# Patient Record
Sex: MALE | Race: BLACK OR AFRICAN AMERICAN | NOT HISPANIC OR LATINO | Employment: FULL TIME | URBAN - METROPOLITAN AREA
[De-identification: names, ages, dates, MRNs, and addresses within clinical notes are randomized per-mention and may not be internally consistent; named-entity substitution may affect disease eponyms.]

---

## 2017-05-01 ENCOUNTER — HOSPITAL ENCOUNTER (EMERGENCY)
Facility: OTHER | Age: 27
Discharge: HOME | End: 2017-05-01
Attending: EMERGENCY MEDICINE
Payer: MEDICAID

## 2017-05-01 VITALS
WEIGHT: 200 LBS | TEMPERATURE: 100 F | HEIGHT: 70 IN | SYSTOLIC BLOOD PRESSURE: 134 MMHG | DIASTOLIC BLOOD PRESSURE: 92 MMHG | RESPIRATION RATE: 18 BRPM | HEART RATE: 91 BPM | OXYGEN SATURATION: 100 % | BODY MASS INDEX: 28.63 KG/M2

## 2017-05-01 DIAGNOSIS — K21.9 GASTROESOPHAGEAL REFLUX DISEASE, ESOPHAGITIS PRESENCE NOT SPECIFIED: ICD-10-CM

## 2017-05-01 DIAGNOSIS — L73.9 FOLLICULITIS: Primary | ICD-10-CM

## 2017-05-01 DIAGNOSIS — T23.109A: ICD-10-CM

## 2017-05-01 PROCEDURE — 99283 EMERGENCY DEPT VISIT LOW MDM: CPT

## 2017-05-01 RX ORDER — METOCLOPRAMIDE 10 MG/1
10 TABLET ORAL EVERY 6 HOURS PRN
Qty: 30 TABLET | Refills: 0 | Status: SHIPPED | OUTPATIENT
Start: 2017-05-01 | End: 2018-01-06

## 2017-05-01 RX ORDER — ONDANSETRON 4 MG/1
4 TABLET, ORALLY DISINTEGRATING ORAL EVERY 6 HOURS PRN
Qty: 10 TABLET | Refills: 0 | Status: SHIPPED | OUTPATIENT
Start: 2017-05-01 | End: 2018-01-06

## 2017-05-01 RX ORDER — BUSPIRONE HYDROCHLORIDE 15 MG/1
15 TABLET ORAL 3 TIMES DAILY
COMMUNITY
End: 2018-05-31

## 2017-05-01 RX ORDER — CLINDAMYCIN HYDROCHLORIDE 150 MG/1
150 CAPSULE ORAL 4 TIMES DAILY
Qty: 28 CAPSULE | Refills: 0 | Status: SHIPPED | OUTPATIENT
Start: 2017-05-01 | End: 2017-05-08

## 2017-05-01 RX ORDER — SILVER SULFADIAZINE 10 G/1000G
CREAM TOPICAL 2 TIMES DAILY
Qty: 20 G | Refills: 0 | Status: SHIPPED | OUTPATIENT
Start: 2017-05-01 | End: 2018-01-06

## 2017-05-01 RX ORDER — PANTOPRAZOLE SODIUM 20 MG/1
20 TABLET, DELAYED RELEASE ORAL DAILY
Qty: 30 TABLET | Refills: 0 | Status: SHIPPED | OUTPATIENT
Start: 2017-05-01 | End: 2018-05-31

## 2017-05-01 NOTE — ED AVS SNAPSHOT
[facility] EMERGENCY DEPARTMENT  [address] [facility] [address]               Ricki Alaniz   2017  1:52 PM   ED    Description:  Male : 1990   Department:  [facility] Emergency Department           Your Care was Coordinated By:     Provider Role From To    Efrem Syed MD Attending Provider 17 [phone] --      Reason for Visit     Abdominal Pain           Diagnoses this Visit        Comments    Folliculitis    -  Primary     Gastroesophageal reflux disease, esophagitis presence not specified           ED Disposition     ED Disposition Condition Comment    Discharge             To Do List           Follow-up Information     Follow up with Giovanny Edwards MD In 1 week.    Specialty:  Family Medicine    Contact information:    [address] [facility] [address]  [phone]         These Medications        Disp Refills Start End    pantoprazole (PROTONIX) 20 MG tablet 30 tablet 0 2017    Take 1 tablet (20 mg total) by mouth once daily. - Oral    Pharmacy: [facility] [address] AT [facility] #: [phone]       ondansetron (ZOFRAN-ODT) 4 MG TbDL 10 tablet 0 2017     Take 1 tablet (4 mg total) by mouth every 6 (six) hours as needed. - Oral    Pharmacy: [facility] [address] AT [facility] #: [phone]       metoclopramide HCl (REGLAN) 10 MG tablet 30 tablet 0 2017     Take 1 tablet (10 mg total) by mouth every 6 (six) hours as needed. - Oral    Pharmacy: [facility] [address] AT [facility] #: [phone]       clindamycin (CLEOCIN) 150 MG capsule 28 capsule 0 2017    Take 1 capsule (150 mg total) by mouth 4 (four) times daily. - Oral    Pharmacy: [facility] [address] AT [facility] #: [phone]         [facility]      [facility] - 24/7 Assistance  Unless otherwise directed by your provider, please contact [facility], our nurse care line that is available for 24/7 assistance.     Registered nurses in the [facility] provide: appointment scheduling, clinical advisement, health education, and other advisory services.  Call: [phone] (toll free)               Medications           Message regarding Medications     Verify the changes and/or additions to your medication regime listed below are the same as discussed with your clinician today.  If any of these changes or additions are incorrect, please notify your healthcare provider.        START taking these NEW medications        Refills    pantoprazole (PROTONIX) 20 MG tablet 0    Sig: Take 1 tablet (20 mg total) by mouth once daily.    Class: Print    Route: Oral    ondansetron (ZOFRAN-ODT) 4 MG TbDL 0    Sig: Take 1 tablet (4 mg total) by mouth every 6 (six) hours as needed.    Class: Print    Route: Oral    metoclopramide HCl (REGLAN) 10 MG tablet 0    Sig: Take 1 tablet (10 mg total) by mouth every 6 (six) hours as needed.    Class: Print    Route: Oral    clindamycin (CLEOCIN) 150 MG capsule 0    Sig: Take 1 capsule (150 mg total) by mouth 4 (four) times daily.    Class: Print    Route: Oral           Verify that the below list of medications is an accurate representation of the medications you are currently taking.  If none reported, the list may be blank. If incorrect, please contact your healthcare provider. Carry this list with you in case of emergency.           Current Medications     busPIRone (BUSPAR) 15 MG tablet Take 15 mg by mouth 3 (three) times daily.    clindamycin (CLEOCIN) 150 MG capsule Take 1 capsule (150 mg total) by mouth 4 (four) times daily.    lorazepam (ATIVAN) 0.5 MG tablet Take 1 tablet (0.5 mg total) by mouth every 6 (six) hours as needed for Anxiety.    metoclopramide HCl (REGLAN) 10 MG tablet Take 1 tablet  "(10 mg total) by mouth every 6 (six) hours as needed.    ondansetron (ZOFRAN-ODT) 4 MG TbDL Take 1 tablet (4 mg total) by mouth every 6 (six) hours as needed.    pantoprazole (PROTONIX) 20 MG tablet Take 1 tablet (20 mg total) by mouth once daily.    ranitidine (ZANTAC) 150 MG capsule Take 1 capsule (150 mg total) by mouth 2 (two) times daily.           Clinical Reference Information           Your Vitals Were     BP Pulse Temp Resp Height Weight    134/92 (BP Location: Left arm, Patient Position: Sitting) 91 99.8 °F (37.7 °C) (Temporal) 18 5' 10" (1.778 m) 90.7 kg (200 lb)    SpO2 BMI             100% 28.7 kg/m2         Allergies as of 5/1/2017        Reactions    Hydrocodone Nausea And Vomiting      Immunizations Administered on Date of Encounter - 5/1/2017     None      ED Micro, Lab, POCT     None      ED Imaging Orders     None        Discharge Instructions         Tips to Control Acid Reflux    To control acid reflux, youll need to make some basic diet and lifestyle changes. The simple steps outlined below may be all youll need to ease discomfort.  Watch what you eat  · Avoid fatty foods and spicy foods.  · Eat fewer acidic foods, such as citrus and tomato-based foods. These can increase symptoms.  · Limit drinking alcohol, caffeine, and fizzy beverages. All increase acid reflux.  · Try limiting chocolate, peppermint, and spearmint. These can worsen acid reflux in some people.  Watch when you eat  · Avoid lying down for 3 hours after eating.  · Do not snack before going to bed.  Raise your head  Raising your head and upper body by 4 to 6 inches helps limit reflux when youre lying down. Put blocks under the head of your bed frame to raise it.  Other changes  · Lose weight, if you need to  · Dont exercise near bedtime  · Avoid tight-fitting clothes  · Limit aspirin and ibuprofen  · Stop smoking   Date Last Reviewed: 7/1/2016  © 6864-6173 [employer]. [address] [place] [address], [place] [address]. " All rights reserved. This information is not intended as a substitute for professional medical care. Always follow your healthcare professional's instructions.          Discharge References/Attachments     FOLLICULITIS (ENGLISH)      [facility] Sign-Up     Activating your [facility] account is as easy as 1-2-3!     1) Visit [email or web address], select Sign Up Now, enter this activation code and your date of birth, then select Next.  [email or web address]  Expires: 6/15/2017  2:26 PM      2) Create a username and password to use when you visit [facility] in the future and select a security question in case you lose your password and select Next.    3) Enter your e-mail address and click Sign Up!    Additional Information  If you have questions, please e-mail [email or web address] or call [phone] to talk to our [facility] staff. Remember, [facility] is NOT to be used for urgent needs. For medical emergencies, dial 911.         Smoking Cessation     If you would like to quit smoking:   You may be eligible for free services if you are a [place] resident and started smoking cigarettes before September 1, 1990.  Call the Smoking Cessation Trust ([facility]) toll free at [phone] 223-1743 or [phone] [phone].   Call 1-800-QUIT-NOW if you do not meet the above criteria.   Contact us via email: [email or web address]   View our website for more information: [email or web address]         [facility] complies with applicable Federal civil rights laws and does not discriminate on the basis of race, color, national origin, age, disability, or sex.        Language Assistance Services     ATTENTION: Language assistance services are available, free of charge. Please call [phone].      ATENCIÓN: Si habla esha, tiene a miller disposición servicios gratuitos de asistencia lingüística. Llame al [phone].     CHÚ Ý: N?u b?n nói Ti?ng Vi?t, có các d?ch v? h? tr? ngôn ng? mi?n phí dành cho b?n. G?i s?  [phone].
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
## 2017-05-01 NOTE — DISCHARGE INSTRUCTIONS
Tips to Control Acid Reflux    To control acid reflux, youll need to make some basic diet and lifestyle changes. The simple steps outlined below may be all youll need to ease discomfort.  Watch what you eat  · Avoid fatty foods and spicy foods.  · Eat fewer acidic foods, such as citrus and tomato-based foods. These can increase symptoms.  · Limit drinking alcohol, caffeine, and fizzy beverages. All increase acid reflux.  · Try limiting chocolate, peppermint, and spearmint. These can worsen acid reflux in some people.  Watch when you eat  · Avoid lying down for 3 hours after eating.  · Do not snack before going to bed.  Raise your head  Raising your head and upper body by 4 to 6 inches helps limit reflux when youre lying down. Put blocks under the head of your bed frame to raise it.  Other changes  · Lose weight, if you need to  · Dont exercise near bedtime  · Avoid tight-fitting clothes  · Limit aspirin and ibuprofen  · Stop smoking   Date Last Reviewed: 7/1/2016  © 2714-6166 [employer] [place]. [address] [place] [address], [place] [address]. All rights reserved. This information is not intended as a substitute for professional medical care. Always follow your healthcare professional's instructions.

## 2018-01-06 ENCOUNTER — HOSPITAL ENCOUNTER (EMERGENCY)
Facility: OTHER | Age: 28
Discharge: HOME | End: 2018-01-06
Attending: EMERGENCY MEDICINE
Payer: MEDICAID

## 2018-01-06 VITALS
DIASTOLIC BLOOD PRESSURE: 99 MMHG | OXYGEN SATURATION: 99 % | HEART RATE: 72 BPM | TEMPERATURE: 98 F | HEIGHT: 70 IN | WEIGHT: 195 LBS | RESPIRATION RATE: 18 BRPM | BODY MASS INDEX: 27.92 KG/M2 | SYSTOLIC BLOOD PRESSURE: 163 MMHG

## 2018-01-06 DIAGNOSIS — R10.30 LOWER ABDOMINAL PAIN: Primary | ICD-10-CM

## 2018-01-06 DIAGNOSIS — K59.00 CONSTIPATION, UNSPECIFIED CONSTIPATION TYPE: ICD-10-CM

## 2018-01-06 LAB
BILIRUBIN, POC UA: ABNORMAL
BLOOD, POC UA: ABNORMAL
CLARITY, POC UA: CLEAR
COLOR, POC UA: YELLOW
GLUCOSE, POC UA: NEGATIVE
KETONES, POC UA: ABNORMAL
LEUKOCYTE EST, POC UA: NEGATIVE
NITRITE, POC UA: NEGATIVE
PH UR STRIP: 5.5 [PH]
PROTEIN, POC UA: ABNORMAL
SPECIFIC GRAVITY, POC UA: >=1.03
UROBILINOGEN, POC UA: 1 E.U./DL

## 2018-01-06 PROCEDURE — 81003 URINALYSIS AUTO W/O SCOPE: CPT

## 2018-01-06 PROCEDURE — 99284 EMERGENCY DEPT VISIT MOD MDM: CPT

## 2018-01-06 RX ORDER — SYRING-NEEDL,DISP,INSUL,0.3 ML 29 G X1/2"
296 SYRINGE, EMPTY DISPOSABLE MISCELLANEOUS ONCE AS NEEDED
Qty: 295 ML | Refills: 0 | COMMUNITY
Start: 2018-01-06 | End: 2018-01-06

## 2018-01-06 NOTE — ED TRIAGE NOTES
Pt presents to ER with c/o LLQ pain that radiates down into groin accompanied by nausea that started today.  He had diarrhea and nausea 3 days ago.

## 2018-01-06 NOTE — ED PROVIDER NOTES
Encounter Date: 1/6/2018       History     Chief Complaint   Patient presents with    Abdominal Pain     pt c/o LLQ ABD pain x 2 day, pt also c/o intermitted nausea and diarrhea x 3 days ago     27 y.o. Male with past medical history of schizophrenia, bipolar, depression and chronic constipation presents to emergency department complaining of acute constipation, nausea, left lower quadrant abdominal pain and difficulty urinating since yesterday.  Patient localizes abdominal pain to left lower quadrant and is mild and intermittent.  He states he suffered with constipation and had a good bowel movement in 3 days.  He reports only scant, thick,watery stool yesterday.  He also reports urinary frequency yesterday and states he has not been able to urinate at all today.      The history is provided by the patient.     Review of patient's allergies indicates:   Allergen Reactions    Hydrocodone Nausea And Vomiting     Past Medical History:   Diagnosis Date    Bipolar 1 disorder     Constipation     Depression     Schizophrenia      History reviewed. No pertinent surgical history.  No family history on file.  Social History   Substance Use Topics    Smoking status: Current Every Day Smoker     Packs/day: 1.00    Smokeless tobacco: Not on file    Alcohol use 0.6 oz/week     1 Shots of liquor per week      Comment: occasionally beer     Review of Systems   Constitutional: Positive for chills and fever (subjective).   HENT: Negative.    Eyes: Negative.    Respiratory: Negative.    Cardiovascular: Negative.    Gastrointestinal: Positive for abdominal pain, constipation and nausea. Negative for blood in stool and vomiting.   Genitourinary: Positive for decreased urine volume, difficulty urinating and frequency (yesterday now resolved). Negative for dysuria, hematuria and urgency.   Neurological: Negative for dizziness.       Physical Exam     Initial Vitals [01/06/18 0128]   BP Pulse Resp Temp SpO2   (!) 169/109 74 18  98.3 °F (36.8 °C) 99 %      MAP       129         Physical Exam    Nursing note and vitals reviewed.  Constitutional: He appears well-developed and well-nourished. He is not diaphoretic. No distress.   HENT:   Head: Normocephalic and atraumatic.   Eyes: Conjunctivae are normal. Pupils are equal, round, and reactive to light.   Neck: Normal range of motion. Neck supple.   Cardiovascular: Regular rhythm and intact distal pulses.   Pulmonary/Chest: No accessory muscle usage. No tachypnea. No respiratory distress.   Abdominal: He exhibits no distension. There is no tenderness.   Musculoskeletal: Normal range of motion. He exhibits no tenderness.   Neurological: He is alert and oriented to person, place, and time.   Skin: Skin is warm and intact.   Psychiatric: He has a normal mood and affect.         ED Course   Procedures  Labs Reviewed   POCT URINALYSIS W/O SCOPE   POCT URINALYSIS W/O SCOPE             Medical Decision Making:   Differential Diagnosis:   Nephrolithiasis, pyelonephritis, diverticulitis, others.   Clinical Tests:   Lab Tests: Ordered and Reviewed  Radiological Study: Ordered and Reviewed  ED Management:  Patient able to void in the emergency department without difficulty. Abdominal exam benign.  Will treat constipation and refer to PCP.                    ED Course      Labs Reviewed  Admission on 01/06/2018   Component Date Value Ref Range Status    Glucose, UA 01/06/2018 Negative*  Final    Bilirubin, UA 01/06/2018 2+*  Final    Ketones, UA 01/06/2018 Trace*  Final    Spec Grav UA 01/06/2018 >=1.030*  Final    Blood, UA 01/06/2018 Trace-lysed*  Final    PH, UA 01/06/2018 5.5   Final    Protein, UA 01/06/2018 1+*  Final    Urobilinogen, UA 01/06/2018 1.0  E.U./dL Final    Nitrite, UA 01/06/2018 Negative*  Final    Leukocytes, UA 01/06/2018 Negative*  Final    Color, UA 01/06/2018 Yellow   Final    Clarity, UA 01/06/2018 Clear   Final        Imaging Reviewed    Imaging Results          CT  Renal Stone Study ABD Pelvis WO (Final result)  Result time 01/06/18 02:40:45    Final result by Davonte Harirson MD (01/06/18 02:40:45)                 Impression:      No acute intra-abdominal abnormalities identified.        Electronically signed by: DAVONTE HARRISON MD  Date:     01/06/18  Time:    02:40              Narrative:    Clinical indication: 27-year-old male with left lower quadrant abdominal pain.    Comparison: CT renal stone study 11/2015.    Technique: Transaxial images were obtained through the abdomen and pelvis in 5 mm increments without the use of p.o. or IV contrast.    Findings:  The visualized portion of the heart is unremarkable.  The lung bases are clear.    No focal hepatic abnormalities identified on this noncontrast exam.  There is no intra-or extrahepatic biliary ductal dilatation.  The gallbladder is unremarkable.  The stomach, pancreas, spleen, and adrenal glands are unremarkable.    Kidneys show no evidence of stones or hydronephrosis.  Ureters are unremarkable along their courses.      Appendix is visualized and is unremarkable.  The visualized loops of small and large bowel show no evidence of obstruction or inflammation.  No free air or free fluid.    Aorta tapers normally.     Osseous structures are unremarkable.  Subcutaneous soft tissue structures are unremarkable.                              Medications given in ED    Medications - No data to display    Discharge Medications     Medication List with Changes/Refills   New Medications    MAGNESIUM CITRATE SOLUTION    Take 296 mLs by mouth once as needed (constipation).   Current Medications    BUSPIRONE (BUSPAR) 15 MG TABLET    Take 15 mg by mouth 3 (three) times daily.    PANTOPRAZOLE (PROTONIX) 20 MG TABLET    Take 1 tablet (20 mg total) by mouth once daily.   Discontinued Medications    LORAZEPAM (ATIVAN) 0.5 MG TABLET    Take 1 tablet (0.5 mg total) by mouth every 6 (six) hours as needed for Anxiety.    METOCLOPRAMIDE HCL  (REGLAN) 10 MG TABLET    Take 1 tablet (10 mg total) by mouth every 6 (six) hours as needed.    ONDANSETRON (ZOFRAN-ODT) 4 MG TBDL    Take 1 tablet (4 mg total) by mouth every 6 (six) hours as needed.    RANITIDINE (ZANTAC) 150 MG CAPSULE    Take 1 capsule (150 mg total) by mouth 2 (two) times daily.    SILVER SULFADIAZINE 1% (SILVADENE) 1 % CREAM    Apply topically 2 (two) times daily.             Patient discharged to home in stable condition with instructions to:   1. Please take all meds as prescribed.  2. Follow-up with your primary care doctor   3. Return precautions discussed and patient and/or family/caretaker understands to return to the emergency room for any concerns including worsening of your current symptoms, fever, chills, night sweats, worsening pain, chest pain, shortness of breath, nausea, vomiting, diarrhea, bleeding, headache, difficulty talking, visual disturbances, weakness, numbness or any other acute concerns    Note was created using voice recognition software. Note may have occasional typographical errors that may not have been identified and edited despite good driss initial review prior to signing.    Clinical Impression:   The primary encounter diagnosis was Lower abdominal pain. A diagnosis of Constipation, unspecified constipation type was also pertinent to this visit.                           Ana Andino MD  01/06/18 8818

## 2018-05-31 ENCOUNTER — HOSPITAL ENCOUNTER (EMERGENCY)
Facility: HOSPITAL | Age: 28
Discharge: HOME | End: 2018-05-31
Attending: EMERGENCY MEDICINE
Payer: MEDICAID

## 2018-05-31 VITALS
OXYGEN SATURATION: 100 % | RESPIRATION RATE: 15 BRPM | SYSTOLIC BLOOD PRESSURE: 105 MMHG | DIASTOLIC BLOOD PRESSURE: 61 MMHG | TEMPERATURE: 98 F | HEART RATE: 56 BPM

## 2018-05-31 DIAGNOSIS — E87.6 HYPOKALEMIA: ICD-10-CM

## 2018-05-31 DIAGNOSIS — R25.2 MUSCLE CRAMPING: Primary | ICD-10-CM

## 2018-05-31 DIAGNOSIS — R74.8 ELEVATED CK: ICD-10-CM

## 2018-05-31 LAB
ALBUMIN SERPL BCP-MCNC: 4.5 G/DL
ALP SERPL-CCNC: 99 U/L
ALT SERPL W/O P-5'-P-CCNC: 20 U/L
ANION GAP SERPL CALC-SCNC: 16 MMOL/L
AST SERPL-CCNC: 31 U/L
BACTERIA #/AREA URNS HPF: ABNORMAL /HPF
BASOPHILS # BLD AUTO: 0.03 K/UL
BASOPHILS NFR BLD: 0.2 %
BILIRUB SERPL-MCNC: 1.1 MG/DL
BILIRUB UR QL STRIP: NEGATIVE
BUN SERPL-MCNC: 12 MG/DL
CALCIUM SERPL-MCNC: 9.9 MG/DL
CHLORIDE SERPL-SCNC: 99 MMOL/L
CK SERPL-CCNC: 536 U/L
CK SERPL-CCNC: 550 U/L
CLARITY UR: ABNORMAL
CO2 SERPL-SCNC: 23 MMOL/L
COLOR UR: YELLOW
CREAT SERPL-MCNC: 1.5 MG/DL
DIFFERENTIAL METHOD: ABNORMAL
EOSINOPHIL # BLD AUTO: 0 K/UL
EOSINOPHIL NFR BLD: 0.2 %
ERYTHROCYTE [DISTWIDTH] IN BLOOD BY AUTOMATED COUNT: 12.4 %
EST. GFR  (AFRICAN AMERICAN): >60 ML/MIN/1.73 M^2
EST. GFR  (NON AFRICAN AMERICAN): >60 ML/MIN/1.73 M^2
GLUCOSE SERPL-MCNC: 96 MG/DL
GLUCOSE UR QL STRIP: NEGATIVE
HCT VFR BLD AUTO: 41.1 %
HGB BLD-MCNC: 14.6 G/DL
HGB UR QL STRIP: NEGATIVE
HYALINE CASTS #/AREA URNS LPF: 6 /LPF
KETONES UR QL STRIP: ABNORMAL
LEUKOCYTE ESTERASE UR QL STRIP: NEGATIVE
LYMPHOCYTES # BLD AUTO: 2.3 K/UL
LYMPHOCYTES NFR BLD: 13 %
MCH RBC QN AUTO: 31.5 PG
MCHC RBC AUTO-ENTMCNC: 35.5 G/DL
MCV RBC AUTO: 89 FL
MICROSCOPIC COMMENT: ABNORMAL
MONOCYTES # BLD AUTO: 1.8 K/UL
MONOCYTES NFR BLD: 9.9 %
NEUTROPHILS # BLD AUTO: 13.5 K/UL
NEUTROPHILS NFR BLD: 76.3 %
NITRITE UR QL STRIP: NEGATIVE
PH UR STRIP: 5 [PH] (ref 5–8)
PLATELET # BLD AUTO: 205 K/UL
PMV BLD AUTO: 9.9 FL
POTASSIUM SERPL-SCNC: 2.9 MMOL/L
PROT SERPL-MCNC: 7.9 G/DL
PROT UR QL STRIP: ABNORMAL
RBC # BLD AUTO: 4.63 M/UL
RBC #/AREA URNS HPF: 1 /HPF (ref 0–4)
SODIUM SERPL-SCNC: 138 MMOL/L
SP GR UR STRIP: 1.02 (ref 1–1.03)
SQUAMOUS #/AREA URNS HPF: 3 /HPF
URN SPEC COLLECT METH UR: ABNORMAL
UROBILINOGEN UR STRIP-ACNC: ABNORMAL EU/DL
WBC # BLD AUTO: 17.74 K/UL
WBC #/AREA URNS HPF: 1 /HPF (ref 0–5)

## 2018-05-31 PROCEDURE — 93010 ELECTROCARDIOGRAM REPORT: CPT | Mod: ,,, | Performed by: INTERNAL MEDICINE

## 2018-05-31 PROCEDURE — 81000 URINALYSIS NONAUTO W/SCOPE: CPT

## 2018-05-31 PROCEDURE — 85025 COMPLETE CBC W/AUTO DIFF WBC: CPT

## 2018-05-31 PROCEDURE — 25000003 PHARM REV CODE 250: Performed by: EMERGENCY MEDICINE

## 2018-05-31 PROCEDURE — 93005 ELECTROCARDIOGRAM TRACING: CPT

## 2018-05-31 PROCEDURE — 96361 HYDRATE IV INFUSION ADD-ON: CPT

## 2018-05-31 PROCEDURE — 82550 ASSAY OF CK (CPK): CPT

## 2018-05-31 PROCEDURE — 96360 HYDRATION IV INFUSION INIT: CPT

## 2018-05-31 PROCEDURE — 80053 COMPREHEN METABOLIC PANEL: CPT

## 2018-05-31 PROCEDURE — 99284 EMERGENCY DEPT VISIT MOD MDM: CPT | Mod: 25

## 2018-05-31 RX ORDER — METHOCARBAMOL 500 MG/1
1000 TABLET, FILM COATED ORAL
Status: COMPLETED | OUTPATIENT
Start: 2018-05-31 | End: 2018-05-31

## 2018-05-31 RX ORDER — POTASSIUM CHLORIDE 20 MEQ/1
40 TABLET, EXTENDED RELEASE ORAL
Status: COMPLETED | OUTPATIENT
Start: 2018-05-31 | End: 2018-05-31

## 2018-05-31 RX ADMIN — SODIUM CHLORIDE 1000 ML: 0.9 INJECTION, SOLUTION INTRAVENOUS at 06:05

## 2018-05-31 RX ADMIN — METHOCARBAMOL 1000 MG: 500 TABLET ORAL at 06:05

## 2018-05-31 RX ADMIN — POTASSIUM CHLORIDE 40 MEQ: 1500 TABLET, EXTENDED RELEASE ORAL at 08:05

## 2018-05-31 NOTE — ED PROVIDER NOTES
Encounter Date: 5/31/2018    SCRIBE #1 NOTE: I, Neelam Barboza, am scribing for, and in the presence of,  Alma Dominguez MD. I have scribed the following portions of the note - Other sections scribed: HPI, ROS, PE and MDM.       History     Chief Complaint   Patient presents with    Muscle Cramps     Pt working outside all day.     CC: Muscle Cramps    HPI: This 27 y.o. Male, with a medical history of bipolar 1 disorder and schizophrenia, presents to the ED via EMS transportation c/o acute, constant generalized muscle cramps that began at 11:00 am this morning. Pt reports that he has been working outside in the hot sun today and has not consumed enough liquids. He states that he was diagnosed with rhabdomyolysis at [facility] last Wednesday (5/23/18) and adds that he has been taking Clindamycin as well as Amoxicillin and pain medication for a rash and oral abscess. Pt notes that he returned to work yesterday. He adds that he had to strain in order to urinate lately, noting that his urine has been dark yellow in color. No other associated symptoms. No alleviating factors.           The history is provided by the patient. No  was used.     Review of patient's allergies indicates:   Allergen Reactions    Hydrocodone Nausea And Vomiting     Past Medical History:   Diagnosis Date    Bipolar 1 disorder     Constipation     Depression     Schizophrenia      History reviewed. No pertinent surgical history.  History reviewed. No pertinent family history.  Social History   Substance Use Topics    Smoking status: Current Every Day Smoker     Packs/day: 1.00    Smokeless tobacco: Never Used    Alcohol use 0.6 oz/week     1 Shots of liquor per week      Comment: occasionally beer     Review of Systems   Constitutional: Negative for chills and fever.   HENT: Negative for rhinorrhea and sore throat.    Eyes: Negative for pain.   Respiratory: Negative for cough.     Cardiovascular: Negative for chest pain.   Gastrointestinal: Negative for abdominal pain, diarrhea, nausea and vomiting.   Genitourinary: Negative for dysuria.        (+) straining to urinate (urine is dark yellow in color)   Musculoskeletal: Negative for back pain.        (+) generalized muscle cramps   Skin: Negative for rash.   Neurological: Negative for syncope and weakness.   Psychiatric/Behavioral: The patient is not nervous/anxious.        Physical Exam     Initial Vitals [05/31/18 1822]   BP Pulse Resp Temp SpO2   131/81 68 16 97.5 °F (36.4 °C) 100 %      MAP       97.67         Physical Exam    Nursing note and vitals reviewed.  Constitutional: Vital signs are normal. He appears well-developed and well-nourished. He is active.  Non-toxic appearance. No distress.   HENT:   Head: Normocephalic and atraumatic.   Eyes: EOM are normal.   Neck: Trachea normal. Neck supple.   Cardiovascular: Normal rate, regular rhythm and normal heart sounds. Exam reveals no friction rub.    No murmur heard.  Pulmonary/Chest: Breath sounds normal. No respiratory distress. He has no wheezes. He has no rhonchi. He has no rales.   Abdominal: Soft. Normal appearance and bowel sounds are normal. He exhibits no distension. There is no tenderness.   Musculoskeletal: Normal range of motion. He exhibits no edema.   Generalized body aches present. Patient in pain with active movement.   Neurological: He is alert.   No focal deficits.   Skin: Skin is warm, dry and intact.   Psychiatric: He has a normal mood and affect.         ED Course   Procedures  Labs Reviewed   CBC W/ AUTO DIFFERENTIAL - Abnormal; Notable for the following:        Result Value    WBC 17.74 (*)     MCH 31.5 (*)     Gran # (ANC) 13.5 (*)     Mono # 1.8 (*)     Gran% 76.3 (*)     Lymph% 13.0 (*)     All other components within normal limits   COMPREHENSIVE METABOLIC PANEL - Abnormal; Notable for the following:     Potassium 2.9 (*)     Creatinine 1.5 (*)     Total  Bilirubin 1.1 (*)     All other components within normal limits   URINALYSIS - Abnormal; Notable for the following:     Appearance, UA Hazy (*)     Protein, UA 1+ (*)     Ketones, UA Trace (*)     Urobilinogen, UA 2.0-3.0 (*)     All other components within normal limits   CK - Abnormal; Notable for the following:      (*)     All other components within normal limits   URINALYSIS MICROSCOPIC - Abnormal; Notable for the following:     Hyaline Casts, UA 6 (*)     All other components within normal limits   CK - Abnormal; Notable for the following:      (*)     All other components within normal limits    Narrative:     Repeat after 2nd fluid bolus     EKG Readings: (Independently Interpreted)   Sinus bradycardia rate 49 (previous EKG 2016 similar with rate of 46 bpm.          Medical Decision Making:   Initial Assessment:   27 y.o male  with a history of recently diagnosed rhabdomyolysis last week, presents today with similar symptoms of body cramps while working in the hot sun today.   Differential Diagnosis:   Rhabdomyolysis vs dehydration vs metabolic derangement vs anxiety. Due to recent hx of rhabdomyolysis will need to further evaluate.   ED Management:  Will get labs including CK. Will hydrate, treat muscle cramps and reassess disposition pending results.     Update note: labs show initial CK of 550 that is treading down after fluid fluids and a Cr of 1.7. Patient urinating multiple times in ED with IV fluid hydration. Potassium 2.9 and replaced PO. Patient tolerated PO. EKG without signs of U waves and with sinus bradycardia rate 49 bmp without any complaints. (similar EKG in 2016) Will discharge with recs for PO hydration and pcp f/u.    10:56 PM 5/31/2018  Paco Dominguez MD                  Scribe Attestation:   Scribe #1: I performed the above scribed service and the documentation accurately describes the services I performed. I attest to the accuracy of the  note.    Attending Attestation:           Physician Attestation for Scribe:  Physician Attestation Statement for Scribe #1: I, Alma Dominguez MD, reviewed documentation, as scribed by Neelam Barboza in my presence, and it is both accurate and complete.                    Clinical Impression:   The primary encounter diagnosis was Muscle cramping. Diagnoses of Hypokalemia and Elevated CK were also pertinent to this visit.                           Alma Dominguez MD  05/31/18 6224       Alma Dominguez MD  05/31/18 6593

## 2018-05-31 NOTE — ED TRIAGE NOTES
Pt.reports being dehydrated and recently diagnosed with rhabdomyolysis, pt. Reports not drinking or eating enough today and started experiencing generalized muscle cramps. Pt. Rates pain 10/10, denies any n/v/d, pt. Is diaphoretic and anxious in NAD. Soiled clothing removed and pt. Placed in dry clothing.

## 2018-06-01 NOTE — ED NOTES
Received report from Janice. Pt is awaiting lab work and is currently infusion fluids. No distress is noted. No complaints at this time. Family is at bedside, call bell in reach, side rails up x2. No distress is noted.Will continue to be monitored.

## 2019-03-21 ENCOUNTER — HOSPITAL ENCOUNTER (EMERGENCY)
Facility: HOSPITAL | Age: 29
Discharge: HOME | End: 2019-03-21
Attending: INTERNAL MEDICINE
Payer: MEDICAID

## 2019-03-21 VITALS
DIASTOLIC BLOOD PRESSURE: 95 MMHG | BODY MASS INDEX: 25.48 KG/M2 | HEART RATE: 87 BPM | OXYGEN SATURATION: 99 % | TEMPERATURE: 101 F | RESPIRATION RATE: 18 BRPM | WEIGHT: 182 LBS | HEIGHT: 71 IN | SYSTOLIC BLOOD PRESSURE: 127 MMHG

## 2019-03-21 DIAGNOSIS — K64.4 EXTERNAL HEMORRHOID: Primary | ICD-10-CM

## 2019-03-21 LAB
BILIRUBIN, POC UA: ABNORMAL
BLOOD, POC UA: NEGATIVE
CLARITY, POC UA: CLEAR
COLOR, POC UA: YELLOW
GLUCOSE, POC UA: NEGATIVE
KETONES, POC UA: ABNORMAL
LEUKOCYTE EST, POC UA: NEGATIVE
NITRITE, POC UA: NEGATIVE
PH UR STRIP: 5.5 [PH]
PROTEIN, POC UA: ABNORMAL
SPECIFIC GRAVITY, POC UA: >=1.03
UROBILINOGEN, POC UA: 0.2 E.U./DL

## 2019-03-21 PROCEDURE — 99283 EMERGENCY DEPT VISIT LOW MDM: CPT | Mod: ER

## 2019-03-21 PROCEDURE — 25000003 PHARM REV CODE 250: Mod: ER | Performed by: INTERNAL MEDICINE

## 2019-03-21 RX ORDER — PRAMOXINE HYDROCHLORIDE 10 MG/G
AEROSOL, FOAM TOPICAL 3 TIMES DAILY PRN
Qty: 15 G | Refills: 0 | Status: SHIPPED | OUTPATIENT
Start: 2019-03-21 | End: 2019-04-10

## 2019-03-21 RX ORDER — ACETAMINOPHEN 500 MG
1000 TABLET ORAL
Status: COMPLETED | OUTPATIENT
Start: 2019-03-21 | End: 2019-03-21

## 2019-03-21 RX ADMIN — ACETAMINOPHEN 1000 MG: 500 TABLET, FILM COATED ORAL at 10:03

## 2019-03-22 NOTE — ED PROVIDER NOTES
Encounter Date: 3/21/2019    SCRIBE #1 NOTE: I, Kary Villagran, am scribing for, and in the presence of,  Dr. Salinas. I have scribed the following portions of the note - Other sections scribed: HPI, ROS, PE.       History     Chief Complaint   Patient presents with    Back Pain     pt reports he has low left sided back pain that radiates around to his pelvic area and testicles. He reports dark urine and dysuria since 3 today. Feels tired and having chills     Ricki Alaniz is a 28 y.o. male who presents to the ED complaining of lower abdominal pain for a few hours. He states burning to his rectum s/p BM at 3PM. He ate nachos with cheese and peppers and notes a burning sensation after BM. No blood in his stool. He reports chills.      The history is provided by the patient. No  was used.     Review of patient's allergies indicates:   Allergen Reactions    Hydrocodone Nausea And Vomiting     Past Medical History:   Diagnosis Date    Bipolar 1 disorder     Constipation     Depression     Schizophrenia      History reviewed. No pertinent surgical history.  History reviewed. No pertinent family history.  Social History     Tobacco Use    Smoking status: Current Every Day Smoker     Packs/day: 1.00    Smokeless tobacco: Never Used   Substance Use Topics    Alcohol use: Yes     Alcohol/week: 0.6 oz     Types: 1 Shots of liquor per week     Comment: occasionally beer    Drug use: No     Review of Systems   Constitutional: Positive for chills.   Gastrointestinal: Positive for abdominal pain and rectal pain. Negative for blood in stool, constipation, diarrhea, nausea and vomiting.   All other systems reviewed and are negative.      Physical Exam     Initial Vitals [03/21/19 2103]   BP Pulse Resp Temp SpO2   (!) 127/95 87 18 (!) 100.5 °F (38.1 °C) 99 %      MAP       --         Physical Exam    Nursing note and vitals reviewed.  Constitutional: He appears well-developed and well-nourished.   HENT:    Head: Normocephalic and atraumatic.   Right Ear: External ear normal.   Left Ear: External ear normal.   Nose: Nose normal.   Mouth/Throat: Oropharynx is clear and moist.   Eyes: Conjunctivae and EOM are normal. Pupils are equal, round, and reactive to light.   Neck: Normal range of motion. Neck supple.   Cardiovascular: Normal rate, regular rhythm, normal heart sounds and intact distal pulses. Exam reveals no gallop and no friction rub.    No murmur heard.  Pulmonary/Chest: Effort normal and breath sounds normal. No respiratory distress. He has no wheezes. He has no rhonchi. He has no rales.   Abdominal: Soft. Bowel sounds are normal. He exhibits no distension. There is no tenderness. There is no rebound.   Genitourinary: Rectal exam shows external hemorrhoid (at 7 o'clock; nonthrombosed).   Musculoskeletal: Normal range of motion.   Neurological: He is alert and oriented to person, place, and time.   Skin: Skin is warm and dry.   Psychiatric: He has a normal mood and affect. His behavior is normal.         ED Course   Procedures  Labs Reviewed   POCT URINALYSIS W/O SCOPE - Abnormal; Notable for the following components:       Result Value    Glucose, UA Negative (*)     Bilirubin, UA 1+ (*)     Ketones, UA Trace (*)     Spec Grav UA >=1.030 (*)     Blood, UA Negative (*)     Protein, UA Trace (*)     Nitrite, UA Negative (*)     Leukocytes, UA Negative (*)     All other components within normal limits   POCT URINALYSIS W/O SCOPE          Imaging Results    None          Medical Decision Making:   History:   Old Medical Records: I decided to obtain old medical records.  Initial Assessment:   This is an emergent evaluation of an 28 y.o. male presenting with lower abdominal pain, and burning to his rectum since 3PM today. He is febrile in triage.   Clinical Tests:   Lab Tests: Ordered and Reviewed            Scribe Attestation:   Scribe #1: I performed the above scribed service and the documentation accurately  describes the services I performed. I attest to the accuracy of the note.    This document was produced by a scribe under my direction and in my presence. I agree with the content of the note and have made any necessary edits.     Dr. Salinas    03/22/2019 6:32 AM             Clinical Impression:     1. External hemorrhoid            Disposition:   Disposition: Discharged  Condition: Stable                        Landry Salinas MD  03/22/19 0633

## 2019-04-09 ENCOUNTER — HOSPITAL ENCOUNTER (EMERGENCY)
Facility: HOSPITAL | Age: 29
Discharge: HOME | End: 2019-04-10
Attending: EMERGENCY MEDICINE
Payer: MEDICAID

## 2019-04-09 DIAGNOSIS — N50.82 SCROTAL PAIN: ICD-10-CM

## 2019-04-09 DIAGNOSIS — R10.9 LEFT FLANK PAIN: ICD-10-CM

## 2019-04-09 DIAGNOSIS — N43.3 HYDROCELE, LEFT: Primary | ICD-10-CM

## 2019-04-09 PROCEDURE — 87491 CHLMYD TRACH DNA AMP PROBE: CPT

## 2019-04-09 PROCEDURE — 96374 THER/PROPH/DIAG INJ IV PUSH: CPT

## 2019-04-09 PROCEDURE — 99284 EMERGENCY DEPT VISIT MOD MDM: CPT | Mod: 25

## 2019-04-09 PROCEDURE — 81003 URINALYSIS AUTO W/O SCOPE: CPT

## 2019-04-09 PROCEDURE — 96361 HYDRATE IV INFUSION ADD-ON: CPT

## 2019-04-09 RX ORDER — MORPHINE SULFATE 10 MG/ML
5 INJECTION INTRAMUSCULAR; INTRAVENOUS; SUBCUTANEOUS
Status: DISCONTINUED | OUTPATIENT
Start: 2019-04-09 | End: 2019-04-09

## 2019-04-09 RX ORDER — ONDANSETRON 2 MG/ML
4 INJECTION INTRAMUSCULAR; INTRAVENOUS
Status: DISCONTINUED | OUTPATIENT
Start: 2019-04-09 | End: 2019-04-09

## 2019-04-10 VITALS
HEIGHT: 71 IN | BODY MASS INDEX: 25.48 KG/M2 | HEART RATE: 60 BPM | WEIGHT: 182 LBS | RESPIRATION RATE: 16 BRPM | SYSTOLIC BLOOD PRESSURE: 116 MMHG | TEMPERATURE: 98 F | OXYGEN SATURATION: 100 % | DIASTOLIC BLOOD PRESSURE: 72 MMHG

## 2019-04-10 LAB
ALBUMIN SERPL BCP-MCNC: 3.2 G/DL (ref 3.5–5.2)
ALP SERPL-CCNC: 68 U/L (ref 55–135)
ALT SERPL W/O P-5'-P-CCNC: 28 U/L (ref 10–44)
ANION GAP SERPL CALC-SCNC: 5 MMOL/L (ref 8–16)
AST SERPL-CCNC: 32 U/L (ref 10–40)
BASOPHILS # BLD AUTO: 0.02 K/UL (ref 0–0.2)
BASOPHILS NFR BLD: 0.3 % (ref 0–1.9)
BILIRUB SERPL-MCNC: 0.7 MG/DL (ref 0.1–1)
BILIRUB UR QL STRIP: NEGATIVE
BUN SERPL-MCNC: 12 MG/DL (ref 6–20)
CALCIUM SERPL-MCNC: 8.7 MG/DL (ref 8.7–10.5)
CHLORIDE SERPL-SCNC: 110 MMOL/L (ref 95–110)
CLARITY UR: CLEAR
CO2 SERPL-SCNC: 29 MMOL/L (ref 23–29)
COLOR UR: YELLOW
CREAT SERPL-MCNC: 0.8 MG/DL (ref 0.5–1.4)
DIFFERENTIAL METHOD: ABNORMAL
EOSINOPHIL # BLD AUTO: 0.1 K/UL (ref 0–0.5)
EOSINOPHIL NFR BLD: 1.9 % (ref 0–8)
ERYTHROCYTE [DISTWIDTH] IN BLOOD BY AUTOMATED COUNT: 13.6 % (ref 11.5–14.5)
EST. GFR  (AFRICAN AMERICAN): >60 ML/MIN/1.73 M^2
EST. GFR  (NON AFRICAN AMERICAN): >60 ML/MIN/1.73 M^2
GLUCOSE SERPL-MCNC: 91 MG/DL (ref 70–110)
GLUCOSE UR QL STRIP: NEGATIVE
HCT VFR BLD AUTO: 36.1 % (ref 40–54)
HGB BLD-MCNC: 12 G/DL (ref 14–18)
HGB UR QL STRIP: NEGATIVE
KETONES UR QL STRIP: NEGATIVE
LEUKOCYTE ESTERASE UR QL STRIP: NEGATIVE
LYMPHOCYTES # BLD AUTO: 3.3 K/UL (ref 1–4.8)
LYMPHOCYTES NFR BLD: 43.8 % (ref 18–48)
MCH RBC QN AUTO: 31.8 PG (ref 27–31)
MCHC RBC AUTO-ENTMCNC: 33.2 G/DL (ref 32–36)
MCV RBC AUTO: 96 FL (ref 82–98)
MONOCYTES # BLD AUTO: 0.6 K/UL (ref 0.3–1)
MONOCYTES NFR BLD: 8.2 % (ref 4–15)
NEUTROPHILS # BLD AUTO: 3.4 K/UL (ref 1.8–7.7)
NEUTROPHILS NFR BLD: 45.8 % (ref 38–73)
NITRITE UR QL STRIP: NEGATIVE
PH UR STRIP: 7 [PH] (ref 5–8)
PLATELET # BLD AUTO: 232 K/UL (ref 150–350)
PMV BLD AUTO: 9.2 FL (ref 9.2–12.9)
POTASSIUM SERPL-SCNC: 3.9 MMOL/L (ref 3.5–5.1)
PROT SERPL-MCNC: 5.4 G/DL (ref 6–8.4)
PROT UR QL STRIP: NEGATIVE
RBC # BLD AUTO: 3.77 M/UL (ref 4.6–6.2)
SODIUM SERPL-SCNC: 144 MMOL/L (ref 136–145)
SP GR UR STRIP: 1.02 (ref 1–1.03)
URN SPEC COLLECT METH UR: NORMAL
UROBILINOGEN UR STRIP-ACNC: NEGATIVE EU/DL
WBC # BLD AUTO: 7.45 K/UL (ref 3.9–12.7)

## 2019-04-10 PROCEDURE — 96374 THER/PROPH/DIAG INJ IV PUSH: CPT

## 2019-04-10 PROCEDURE — 25500020 PHARM REV CODE 255: Performed by: EMERGENCY MEDICINE

## 2019-04-10 PROCEDURE — 96361 HYDRATE IV INFUSION ADD-ON: CPT

## 2019-04-10 PROCEDURE — 85025 COMPLETE CBC W/AUTO DIFF WBC: CPT

## 2019-04-10 PROCEDURE — 25000003 PHARM REV CODE 250: Performed by: PHYSICIAN ASSISTANT

## 2019-04-10 PROCEDURE — 63600175 PHARM REV CODE 636 W HCPCS: Performed by: PHYSICIAN ASSISTANT

## 2019-04-10 PROCEDURE — 80053 COMPREHEN METABOLIC PANEL: CPT

## 2019-04-10 RX ORDER — KETOROLAC TROMETHAMINE 30 MG/ML
10 INJECTION, SOLUTION INTRAMUSCULAR; INTRAVENOUS
Status: COMPLETED | OUTPATIENT
Start: 2019-04-10 | End: 2019-04-10

## 2019-04-10 RX ADMIN — SODIUM CHLORIDE 1000 ML: 0.9 INJECTION, SOLUTION INTRAVENOUS at 12:04

## 2019-04-10 RX ADMIN — KETOROLAC TROMETHAMINE 10 MG: 30 INJECTION, SOLUTION INTRAMUSCULAR at 12:04

## 2019-04-10 RX ADMIN — IOHEXOL 75 ML: 350 INJECTION, SOLUTION INTRAVENOUS at 01:04

## 2019-04-10 NOTE — ED TRIAGE NOTES
Pt arrived to the ED via POV from home with c/o flank pain. Pt reports pain started approx 3 days ago and on today began with nausea. Denies v/d, constipation, urinary symptoms. Rates pain 8/10 on pain scale.

## 2019-04-10 NOTE — ED PROVIDER NOTES
Encounter Date: 4/9/2019       History     Chief Complaint   Patient presents with    Flank Pain     Pt. arrives to ER complaining of left side flank pain that started about 3 days ago after he finished abx. Pt. also reports nausea, denies fever, v/d, rates pain 10/10.     Patient is a 28-year-old male with a history of bipolar 1 disorder, schizophrenia, depression is presenting to the ER for evaluation of left lower quadrant pain. Patient states the symptoms started earlier today.  He states that the pain radiates to his left groin.  He states he has been feeling nauseated, no vomiting.  Patient was recently treated for colitis and finished a course of antibiotics including Cipro and Flagyl.  He denies any diarrhea, blood in stool or black tarry stool.  Patient denies any penile discharge or testicular pain.  Has not noticed any UTI symptoms including dysuria hematuria.  No prior history of kidney infection kidney stones.  No prior abdominal surgeries.  He has not taken any medication for symptoms today.  Patient states he was recently treated for an STD about 2 months ago.  No fevers or chills.    The history is provided by the patient.     Review of patient's allergies indicates:   Allergen Reactions    Hydrocodone Nausea And Vomiting     Past Medical History:   Diagnosis Date    Bipolar 1 disorder     Constipation     Depression     Schizophrenia      History reviewed. No pertinent surgical history.  History reviewed. No pertinent family history.  Social History     Tobacco Use    Smoking status: Current Every Day Smoker     Packs/day: 1.00    Smokeless tobacco: Never Used   Substance Use Topics    Alcohol use: Yes     Alcohol/week: 0.6 oz     Types: 1 Shots of liquor per week     Comment: occasionally beer    Drug use: No     Review of Systems   Constitutional: Negative for chills and fever.   HENT: Negative for congestion.    Respiratory: Negative for cough and shortness of breath.    Cardiovascular:  Negative for chest pain.   Gastrointestinal: Positive for abdominal pain. Negative for blood in stool, constipation, diarrhea, nausea and vomiting.   Genitourinary: Negative for discharge, dysuria, flank pain, hematuria, penile pain, penile swelling, scrotal swelling, testicular pain and urgency.   Skin: Negative for wound.   Allergic/Immunologic: Negative for immunocompromised state.   Neurological: Negative for weakness.   Hematological: Does not bruise/bleed easily.   Psychiatric/Behavioral: Negative for confusion.       Physical Exam     Initial Vitals [04/09/19 2326]   BP Pulse Resp Temp SpO2   110/76 (!) 58 16 98.2 °F (36.8 °C) 100 %      MAP       --         Physical Exam    Vitals reviewed.  Constitutional: He appears well-developed and well-nourished. He is not diaphoretic. No distress.   HENT:   Head: Normocephalic and atraumatic.   Mouth/Throat: Oropharynx is clear and moist.   Eyes: Conjunctivae and EOM are normal.   Neck: Neck supple.   Cardiovascular: Normal rate, regular rhythm, normal heart sounds and intact distal pulses.   Pulmonary/Chest: Breath sounds normal.   Abdominal: Soft. Normal appearance. He exhibits no distension. There is tenderness (left groin) in the left lower quadrant. There is no rigidity, no rebound, no guarding and no CVA tenderness. No hernia. Hernia confirmed negative in the right inguinal area and confirmed negative in the left inguinal area.   Genitourinary: Penis normal. Right testis shows no mass, no swelling and no tenderness. Left testis shows tenderness (Diffuse tenderness to the left scrotum.). Left testis shows no mass and no swelling. Circumcised.   Neurological: He is alert and oriented to person, place, and time.   Skin: Skin is warm and dry.         ED Course   Procedures  Labs Reviewed   CBC W/ AUTO DIFFERENTIAL - Abnormal; Notable for the following components:       Result Value    RBC 3.77 (*)     Hemoglobin 12.0 (*)     Hematocrit 36.1 (*)     MCH 31.8 (*)      All other components within normal limits   COMPREHENSIVE METABOLIC PANEL - Abnormal; Notable for the following components:    Total Protein 5.4 (*)     Albumin 3.2 (*)     Anion Gap 5 (*)     All other components within normal limits   C. TRACHOMATIS/N. GONORRHOEAE BY AMP DNA   URINALYSIS, REFLEX TO URINE CULTURE          Imaging Results          CT Abdomen Pelvis With Contrast (Final result)  Result time 04/10/19 01:16:35    Final result by Katherine Sagastume MD (04/10/19 01:16:35)                 Impression:      No acute intra-abdominal abnormalities identified.      Electronically signed by: Katherine Sagastume MD  Date:    04/10/2019  Time:    01:16             Narrative:    EXAMINATION:  CT ABDOMEN PELVIS WITH CONTRAST    CLINICAL HISTORY:  LLQ pain/ flank pain;    TECHNIQUE:  Low dose axial images, sagittal and coronal reformations were obtained from the lung bases to the pubic symphysis following the IV administration of 75 mL of Omnipaque 350 .  Oral contrast was not given.    COMPARISON:  CT abdomen and pelvis from January 2018.    FINDINGS:  The visualized portion of the heart is unremarkable.  The lung bases are clear.    No significant hepatic abnormalities are identified.  There is no intra-or extrahepatic biliary ductal dilatation.  Gallbladder is contracted.  The stomach, pancreas, spleen, and adrenal glands are unremarkable.    Kidneys enhance normally with no evidence of hydronephrosis.  No definite abnormalities are seen along the ureteral courses.  Urinary bladder and prostate show no significant abnormalities.    Suspected appendix is visualized and is unremarkable.  No abnormalities are seen in the region.  The visualized loops of small and large bowel show no evidence of obstruction or inflammation.  Moderate retained stool is seen in the colon.  No free air or free fluid.    Aorta tapers normally.    No acute osseous abnormality identified. Subcutaneous soft tissue structures are unremarkable.                                US Scrotum And Testicles (Final result)  Result time 04/10/19 01:02:27    Final result by Katherine Sagastume MD (04/10/19 01:02:27)                 Impression:      Small left hydrocele.  No evidence of testicular torsion.      Electronically signed by: Katherine Sagastume MD  Date:    04/10/2019  Time:    01:02             Narrative:    EXAMINATION:  US SCROTUM AND TESTICLES    CLINICAL HISTORY:  Scrotal pain    TECHNIQUE:  Sonography of the scrotum and testes.    COMPARISON:  None.    FINDINGS:  Right testicle measures 3.9 x 2.5 x 2.8 cm.  Left testicle measures 4.1 x 2.6 x 3.2 cm.  Testicles demonstrate normal homogeneous echotexture with no evidence of solid mass.  Normal vascularity is observed.  No evidence of testicular torsion.  Small left hydrocele is seen.  No evidence of right-sided hydrocele.  No evidence of varicocele.  Small bilateral epididymal head cysts are noted.  Epididymis are otherwise normal in appearance bilaterally.                                       APC / Resident Notes:   Patient seen in the ER promptly upon arrival.  He is afebrile, no acute distress.  Minimal tenderness on palpation to the left lower quadrant of the abdomen.  Abdomen soft, nondistended. No CVA tenderness on exam.  Minimal tenderness on palpation to the left scrotum.  There is no erythema, mass or swelling noted. IV access established, labs ordered, fluids given.  Patient given Toradol for discomfort.    Laboratory studies show normal white count of 7.4.  Hemoglobin is stable. Chemistries were found to be unremarkable. Normal liver and kidney functions.  Urinalysis does not show evidence of infection or blood.  GC Chlamydia obtained, pending results.  Patient does not have any concerns for STDs at this time.  Will hold off on antibiotic treatment until cultures result.     CT of the abdomen pelvis does not show any acute intra-abdominal abnormality.  No evidence of diverticulitis or colitis.  There  is moderate retained stool noted. No free air or free fluid.  Ultrasound of the scrotum reveals small left hydrocele.  No evidence of testicular torsion.  Patient advised to follow up with Urology this week.  He was advised to take Tylenol for pain if needed.  He was given strict return precautions the ED.  He is stable for discharge and close follow-up.                 Clinical Impression:       ICD-10-CM ICD-9-CM   1. Hydrocele, left N43.3 603.9   2. Scrotal pain N50.82 608.9   3. Left flank pain R10.9 789.09         Disposition:   Disposition: Discharged  Condition: Stable                        Arlet Arroyo PA-C  04/10/19 0146

## 2019-04-10 NOTE — DISCHARGE INSTRUCTIONS
Please follow-up with Urology if your symptoms persist.  Tylenol for pain if needed.  Follow up with family doctor this week.

## 2019-04-11 LAB
C TRACH DNA SPEC QL NAA+PROBE: NOT DETECTED
N GONORRHOEA DNA SPEC QL NAA+PROBE: NOT DETECTED

## 2019-04-20 ENCOUNTER — HOSPITAL ENCOUNTER (EMERGENCY)
Facility: HOSPITAL | Age: 29
Discharge: HOME | End: 2019-04-20
Attending: EMERGENCY MEDICINE
Payer: MEDICAID

## 2019-04-20 VITALS
SYSTOLIC BLOOD PRESSURE: 127 MMHG | HEIGHT: 70 IN | RESPIRATION RATE: 16 BRPM | BODY MASS INDEX: 26.77 KG/M2 | OXYGEN SATURATION: 98 % | DIASTOLIC BLOOD PRESSURE: 71 MMHG | TEMPERATURE: 98 F | HEART RATE: 73 BPM | WEIGHT: 187 LBS

## 2019-04-20 DIAGNOSIS — Z71.1 CONCERN ABOUT STD IN MALE WITHOUT DIAGNOSIS: Primary | ICD-10-CM

## 2019-04-20 LAB
BILIRUB UR QL STRIP: NEGATIVE
CLARITY UR: CLEAR
COLOR UR: YELLOW
GLUCOSE UR QL STRIP: NEGATIVE
HGB UR QL STRIP: NEGATIVE
KETONES UR QL STRIP: NEGATIVE
LEUKOCYTE ESTERASE UR QL STRIP: NEGATIVE
NITRITE UR QL STRIP: NEGATIVE
PH UR STRIP: 7 [PH] (ref 5–8)
PROT UR QL STRIP: NEGATIVE
SP GR UR STRIP: 1.02 (ref 1–1.03)
URN SPEC COLLECT METH UR: ABNORMAL
UROBILINOGEN UR STRIP-ACNC: ABNORMAL EU/DL

## 2019-04-20 PROCEDURE — 87086 URINE CULTURE/COLONY COUNT: CPT

## 2019-04-20 PROCEDURE — 87491 CHLMYD TRACH DNA AMP PROBE: CPT

## 2019-04-20 PROCEDURE — 99283 EMERGENCY DEPT VISIT LOW MDM: CPT

## 2019-04-20 PROCEDURE — 81003 URINALYSIS AUTO W/O SCOPE: CPT

## 2019-04-20 RX ORDER — PHENAZOPYRIDINE HYDROCHLORIDE 200 MG/1
200 TABLET, FILM COATED ORAL
Qty: 12 TABLET | Refills: 0 | Status: SHIPPED | OUTPATIENT
Start: 2019-04-20 | End: 2019-04-24

## 2019-04-20 NOTE — ED PROVIDER NOTES
Encounter Date: 4/20/2019       History     Chief Complaint   Patient presents with    Exposure to STD     Stated having lower abdominal pain,testicle pain and penile pain for past 2 weeks. Stated had diarrhea this am. Also complained of a drip at the end of his penis     28-year-old male with history of schizophrenia presents to the emergency department for 2 week history of dysuria associated with bilateral testicular pain, worse on the left.  Patient states his current symptoms are identical to the symptoms that he was evaluated for on 04/09 where he was found to have a hydrocele.  He states that his current testicular pain is not different in any way from the symptoms that he was evaluated for in 4/9.  Patient had negative GC cultures at that time.  Denies fever, nausea, vomiting. Patient has not followed up with his PCP or Urology.  No medications taken prior to arrival.        Review of patient's allergies indicates:   Allergen Reactions    Hydrocodone Nausea And Vomiting     Past Medical History:   Diagnosis Date    Bipolar 1 disorder     Constipation     Depression     Gonorrhea     Schizophrenia      History reviewed. No pertinent surgical history.  No family history on file.  Social History     Tobacco Use    Smoking status: Current Every Day Smoker     Packs/day: 1.00     Types: Cigarettes    Smokeless tobacco: Never Used   Substance Use Topics    Alcohol use: Yes     Alcohol/week: 0.6 oz     Types: 1 Shots of liquor per week     Comment: occasionally beer    Drug use: No     Review of Systems   Constitutional: Negative for fever.   HENT: Negative for congestion, sore throat and trouble swallowing.    Respiratory: Negative for cough and shortness of breath.    Cardiovascular: Negative for chest pain.   Gastrointestinal: Negative for abdominal pain, constipation, diarrhea, nausea and vomiting.   Genitourinary: Positive for dysuria and testicular pain. Negative for flank pain, frequency and  urgency.   Musculoskeletal: Negative for back pain.   Skin: Negative for rash.   Neurological: Negative for headaches.   All other systems reviewed and are negative.      Physical Exam     Initial Vitals [04/20/19 0357]   BP Pulse Resp Temp SpO2   127/71 73 16 98.3 °F (36.8 °C) 98 %      MAP       --         Physical Exam    Nursing note and vitals reviewed.  Constitutional: He appears well-developed and well-nourished. He is not diaphoretic. No distress.   HENT:   Head: Normocephalic and atraumatic.   Nose: Nose normal.   Eyes: Conjunctivae and EOM are normal. Right eye exhibits no discharge. Left eye exhibits no discharge.   Neck: Normal range of motion. No tracheal deviation present. No JVD present.   Cardiovascular: Normal rate, regular rhythm and normal heart sounds. Exam reveals no friction rub.    No murmur heard.  Pulmonary/Chest: Breath sounds normal. No stridor. No respiratory distress. He has no wheezes. He has no rhonchi. He has no rales. He exhibits no tenderness.   Abdominal: Soft. He exhibits no distension. There is no tenderness. There is no rigidity, no rebound, no guarding, no CVA tenderness, no tenderness at McBurney's point and negative Richardson's sign.   Genitourinary: Penis normal. Right testis shows no mass, no swelling and no tenderness. Left testis shows no mass, no swelling and no tenderness. No penile erythema or penile tenderness. No discharge found.   Musculoskeletal: Normal range of motion.   Neurological: He is alert and oriented to person, place, and time.   Skin: Skin is warm and dry. No rash and no abscess noted. No erythema. No pallor.         ED Course   Procedures  Labs Reviewed   URINALYSIS, REFLEX TO URINE CULTURE - Abnormal; Notable for the following components:       Result Value    Urobilinogen, UA 2.0-3.0 (*)     All other components within normal limits    Narrative:     Preferred Collection Type->Urine, Clean Catch   C. TRACHOMATIS/N. GONORRHOEAE BY AMP DNA   CULTURE, URINE           Imaging Results    None          Medical Decision Making:   History:   Old Medical Records: I decided to obtain old medical records.  Initial Assessment:   28-year-old male with dysuria  Clinical Tests:   Lab Tests: Ordered and Reviewed  ED Management:  Patients current symptoms are unchanged from his symptoms that he was previously evaluated for this ED on 04/09.  Patient has a known hydrocele that he has not followed up with Urology for yet.  I carefully considered but doubt testicular torsion, epididymitis, and testicular abscess today.  No evidence of UTI on urinalysis today.  Given repeat visit for similar symptoms, I will send a urine culture in addition to GC cultures today.  Recent GC culture negative. I also carefully considered but doubt acute appendicitis and ureteral stone.      Sent home with supportive care. Advising PCP and urology follow up. Strict return precautions discussed. Agreeable to plan.                         Clinical Impression:       ICD-10-CM ICD-9-CM   1. Concern about STD in male without diagnosis Z71.1 V65.5                                Tramaine Gamez PA-C  04/20/19 0556

## 2019-04-20 NOTE — ED TRIAGE NOTES
Patient arrived to ED with c/o LLQ and RLQ abd pain, dysuria, penile discharge, diarrhea, and bilateral testicular pain x 1 week.  Reports possible exposure to STD due to previous symptoms the same as prior symptoms of STD.  Reports hx of gonorrhea x 2 months ago and treated at [facility].  Reports having unprotected sex with one sexual partner.

## 2019-04-22 LAB — BACTERIA UR CULT: NORMAL

## 2019-04-23 LAB
C TRACH DNA SPEC QL NAA+PROBE: NOT DETECTED
N GONORRHOEA DNA SPEC QL NAA+PROBE: NOT DETECTED

## 2019-05-14 ENCOUNTER — HOSPITAL ENCOUNTER (EMERGENCY)
Facility: HOSPITAL | Age: 29
Discharge: HOME | End: 2019-05-14
Attending: EMERGENCY MEDICINE
Payer: MEDICAID

## 2019-05-14 VITALS
OXYGEN SATURATION: 98 % | TEMPERATURE: 98 F | DIASTOLIC BLOOD PRESSURE: 65 MMHG | HEIGHT: 70 IN | SYSTOLIC BLOOD PRESSURE: 116 MMHG | HEART RATE: 86 BPM | BODY MASS INDEX: 26.2 KG/M2 | WEIGHT: 183 LBS | RESPIRATION RATE: 18 BRPM

## 2019-05-14 DIAGNOSIS — Z11.1 NORMAL SCREENING CHEST X-RAY FOR TUBERCULOSIS: Primary | ICD-10-CM

## 2019-05-14 DIAGNOSIS — Z11.1 SCREENING-PULMONARY TB: ICD-10-CM

## 2019-05-14 PROCEDURE — 99283 EMERGENCY DEPT VISIT LOW MDM: CPT | Mod: 25

## 2019-05-15 NOTE — ED PROVIDER NOTES
Encounter Date: 5/14/2019       History     Chief Complaint   Patient presents with    TB Screening     Pt needs chest xray to rule out TB for admission to the shelter in which he as a bed. Pt unable to come earlier due to job obligations.      This is a 28-year-old male who presents for chest x-ray for to be screaming so that he can get a bed in a shelter.  Patient denies any bloody sputum, cough, night sweats, weight loss, contact with anyone with active TB for any concerns.        Review of patient's allergies indicates:   Allergen Reactions    Hydrocodone Nausea And Vomiting     Past Medical History:   Diagnosis Date    Bipolar 1 disorder     Constipation     Depression     Gonorrhea     Schizophrenia      No past surgical history on file.  No family history on file.  Social History     Tobacco Use    Smoking status: Current Every Day Smoker     Packs/day: 1.00     Types: Cigarettes    Smokeless tobacco: Never Used   Substance Use Topics    Alcohol use: Yes     Alcohol/week: 0.6 oz     Types: 1 Shots of liquor per week     Comment: occasionally beer    Drug use: No     Review of Systems   Constitutional: Negative for fever.   HENT: Negative for sore throat.    Respiratory: Negative for shortness of breath.    Cardiovascular: Negative for chest pain.   Gastrointestinal: Negative for nausea.   Genitourinary: Negative for dysuria.   Musculoskeletal: Negative for back pain.   Skin: Negative for rash.   Neurological: Negative for weakness.   Hematological: Does not bruise/bleed easily.       Physical Exam     Initial Vitals [05/14/19 1936]   BP Pulse Resp Temp SpO2   116/65 86 18 97.8 °F (36.6 °C) 98 %      MAP       --         Physical Exam    Nursing note and vitals reviewed.  Constitutional: He appears well-developed and well-nourished.   HENT:   Head: Normocephalic.   Eyes: Conjunctivae are normal.   Neck: Normal range of motion. Neck supple.   Cardiovascular: Normal rate, regular rhythm and normal  heart sounds.   Pulmonary/Chest: Breath sounds normal.   Musculoskeletal: Normal range of motion.   Neurological: He is alert and oriented to person, place, and time.   Skin: Skin is warm and dry.   Psychiatric: He has a normal mood and affect.         ED Course   Procedures  Labs Reviewed - No data to display       Imaging Results    None          Medical Decision Making:   Differential Diagnosis:   Tb, fear complaint, screening exam  ED Management:  Diagnosis management comments: This is an urgent evaluation of a 28-year-old male that presented to the ER with c/o needing TB screening . Pts exam was as above.     xrays were reviewed and discussed with pt.     Based on exam today - I have low suspicion for medical, surgical or other life threatening condition and I believe pt is safe for discharge and outpatient f/u.    Pt verbalizes understanding of d/c instructions and will return for worsening condition.                          Clinical Impression:       ICD-10-CM ICD-9-CM   1. Screening-pulmonary TB Z11.1 V74.1         Disposition:   Disposition: Discharged  Condition: Stable                        Alicia Delatorre NP  05/14/19 2042

## 2019-05-15 NOTE — ED TRIAGE NOTES
"Pt here for TB screening to be cleared for bed at homeless shelter. Pt does state "diaz had a little hacking cough for about a week".   "

## 2019-06-20 ENCOUNTER — HOSPITAL ENCOUNTER (EMERGENCY)
Facility: HOSPITAL | Age: 29
Discharge: HOME | End: 2019-06-20
Attending: EMERGENCY MEDICINE
Payer: MEDICAID

## 2019-06-20 VITALS
OXYGEN SATURATION: 99 % | RESPIRATION RATE: 18 BRPM | DIASTOLIC BLOOD PRESSURE: 58 MMHG | HEIGHT: 70 IN | TEMPERATURE: 98 F | SYSTOLIC BLOOD PRESSURE: 102 MMHG | BODY MASS INDEX: 26.92 KG/M2 | WEIGHT: 188 LBS | HEART RATE: 62 BPM

## 2019-06-20 DIAGNOSIS — K59.00 CONSTIPATION, UNSPECIFIED CONSTIPATION TYPE: ICD-10-CM

## 2019-06-20 DIAGNOSIS — L02.91 ABSCESS OF MULTIPLE SITES: ICD-10-CM

## 2019-06-20 DIAGNOSIS — R74.8 ELEVATED LIPASE: Primary | ICD-10-CM

## 2019-06-20 LAB
ALBUMIN SERPL BCP-MCNC: 3.8 G/DL (ref 3.5–5.2)
ALP SERPL-CCNC: 100 U/L (ref 55–135)
ALT SERPL W/O P-5'-P-CCNC: 20 U/L (ref 10–44)
ANION GAP SERPL CALC-SCNC: 10 MMOL/L (ref 8–16)
AST SERPL-CCNC: 28 U/L (ref 10–40)
BASOPHILS # BLD AUTO: 0.02 K/UL (ref 0–0.2)
BASOPHILS NFR BLD: 0.2 % (ref 0–1.9)
BILIRUB SERPL-MCNC: 0.5 MG/DL (ref 0.1–1)
BILIRUB UR QL STRIP: NEGATIVE
BUN SERPL-MCNC: 21 MG/DL (ref 6–20)
CALCIUM SERPL-MCNC: 9.3 MG/DL (ref 8.7–10.5)
CHLORIDE SERPL-SCNC: 103 MMOL/L (ref 95–110)
CLARITY UR: CLEAR
CO2 SERPL-SCNC: 25 MMOL/L (ref 23–29)
COLOR UR: YELLOW
CREAT SERPL-MCNC: 1 MG/DL (ref 0.5–1.4)
DIFFERENTIAL METHOD: ABNORMAL
EOSINOPHIL # BLD AUTO: 0.3 K/UL (ref 0–0.5)
EOSINOPHIL NFR BLD: 2.6 % (ref 0–8)
ERYTHROCYTE [DISTWIDTH] IN BLOOD BY AUTOMATED COUNT: 12.7 % (ref 11.5–14.5)
EST. GFR  (AFRICAN AMERICAN): >60 ML/MIN/1.73 M^2
EST. GFR  (NON AFRICAN AMERICAN): >60 ML/MIN/1.73 M^2
GLUCOSE SERPL-MCNC: 95 MG/DL (ref 70–110)
GLUCOSE UR QL STRIP: NEGATIVE
HCT VFR BLD AUTO: 46.3 % (ref 40–54)
HGB BLD-MCNC: 15.3 G/DL (ref 14–18)
HGB UR QL STRIP: NEGATIVE
KETONES UR QL STRIP: NEGATIVE
LEUKOCYTE ESTERASE UR QL STRIP: NEGATIVE
LIPASE SERPL-CCNC: 241 U/L (ref 4–60)
LYMPHOCYTES # BLD AUTO: 3.1 K/UL (ref 1–4.8)
LYMPHOCYTES NFR BLD: 31.2 % (ref 18–48)
MCH RBC QN AUTO: 31.2 PG (ref 27–31)
MCHC RBC AUTO-ENTMCNC: 33 G/DL (ref 32–36)
MCV RBC AUTO: 94 FL (ref 82–98)
MICROSCOPIC COMMENT: NORMAL
MONOCYTES # BLD AUTO: 0.9 K/UL (ref 0.3–1)
MONOCYTES NFR BLD: 8.7 % (ref 4–15)
NEUTROPHILS # BLD AUTO: 5.7 K/UL (ref 1.8–7.7)
NEUTROPHILS NFR BLD: 57.3 % (ref 38–73)
NITRITE UR QL STRIP: NEGATIVE
PH UR STRIP: 5 [PH] (ref 5–8)
PLATELET # BLD AUTO: 254 K/UL (ref 150–350)
PMV BLD AUTO: 10.1 FL (ref 9.2–12.9)
POTASSIUM SERPL-SCNC: 3.7 MMOL/L (ref 3.5–5.1)
PROT SERPL-MCNC: 6.9 G/DL (ref 6–8.4)
PROT UR QL STRIP: NEGATIVE
RBC # BLD AUTO: 4.91 M/UL (ref 4.6–6.2)
SODIUM SERPL-SCNC: 138 MMOL/L (ref 136–145)
SP GR UR STRIP: >1.03 (ref 1–1.03)
SQUAMOUS #/AREA URNS HPF: 1 /HPF
URN SPEC COLLECT METH UR: ABNORMAL
UROBILINOGEN UR STRIP-ACNC: NEGATIVE EU/DL
WBC # BLD AUTO: 9.93 K/UL (ref 3.9–12.7)

## 2019-06-20 PROCEDURE — 96374 THER/PROPH/DIAG INJ IV PUSH: CPT

## 2019-06-20 PROCEDURE — 25000003 PHARM REV CODE 250: Performed by: PHYSICIAN ASSISTANT

## 2019-06-20 PROCEDURE — 81000 URINALYSIS NONAUTO W/SCOPE: CPT

## 2019-06-20 PROCEDURE — 85025 COMPLETE CBC W/AUTO DIFF WBC: CPT

## 2019-06-20 PROCEDURE — 83690 ASSAY OF LIPASE: CPT

## 2019-06-20 PROCEDURE — 80053 COMPREHEN METABOLIC PANEL: CPT

## 2019-06-20 PROCEDURE — 99284 EMERGENCY DEPT VISIT MOD MDM: CPT | Mod: 25

## 2019-06-20 PROCEDURE — 96361 HYDRATE IV INFUSION ADD-ON: CPT

## 2019-06-20 PROCEDURE — 63600175 PHARM REV CODE 636 W HCPCS: Performed by: PHYSICIAN ASSISTANT

## 2019-06-20 RX ORDER — DOCUSATE SODIUM 100 MG/1
100 CAPSULE, LIQUID FILLED ORAL 2 TIMES DAILY
Qty: 30 CAPSULE | Refills: 0 | Status: SHIPPED | OUTPATIENT
Start: 2019-06-20 | End: 2020-08-14 | Stop reason: CLARIF

## 2019-06-20 RX ORDER — ACETAMINOPHEN 500 MG
500 TABLET ORAL
Status: COMPLETED | OUTPATIENT
Start: 2019-06-20 | End: 2019-06-20

## 2019-06-20 RX ORDER — ONDANSETRON 2 MG/ML
4 INJECTION INTRAMUSCULAR; INTRAVENOUS
Status: COMPLETED | OUTPATIENT
Start: 2019-06-20 | End: 2019-06-20

## 2019-06-20 RX ORDER — ONDANSETRON 4 MG/1
4 TABLET, FILM COATED ORAL EVERY 8 HOURS PRN
Qty: 12 TABLET | Refills: 0 | OUTPATIENT
Start: 2019-06-20 | End: 2019-08-07

## 2019-06-20 RX ADMIN — SODIUM CHLORIDE 1000 ML: 0.9 INJECTION, SOLUTION INTRAVENOUS at 08:06

## 2019-06-20 RX ADMIN — ACETAMINOPHEN 500 MG: 500 TABLET, FILM COATED ORAL at 09:06

## 2019-06-20 RX ADMIN — ONDANSETRON 4 MG: 2 INJECTION INTRAMUSCULAR; INTRAVENOUS at 08:06

## 2019-06-20 NOTE — ED TRIAGE NOTES
The pt states his lower abdominal pain started yesterday. Reports tingling during urination. Denies fever, vomiting and diarrhea. Reports nausea, left sided groin pain, and abscesses all over his body that are draining pus.

## 2019-06-20 NOTE — ED PROVIDER NOTES
Encounter Date: 6/20/2019    SCRIBE #1 NOTE: I, Scottie Ta, am scribing for, and in the presence of,  Juan J Flroes PA-C. I have scribed the following portions of the note - Other sections scribed: HPI, ROS, PE .       History     Chief Complaint   Patient presents with    Flank Pain     Pt here with c/o right side flank pain, lower abdominal pain, and tesicle pain since yesterday.    Abdominal Pain     This is a 28 y.o. Male with history of tobacco usage (1 pack a day) who presents to the ED complaining of severe (rated 10/10), constant, pressure-like right flank pain that started yesterday. Pt also experiences cramping, constant, lower abdominal pain for the past 3 days with constipation and nausea. His last strong bowel movement was 1 week ago. He reports having left greater than right, intermittent testicular pain for 2 weeks. His symptoms are similar to the last time he had testicular pain. Pt reports having difficulty urinating and decreased urine output. He denies dysuria, hematuria, penile discharge, CP, having medical problems, history of surgeries, alcohol use, and drug use, . Patient notes he has been eating a lot within the past week. He works on a garbage truck. Over the past few weeks, the patient has also noticed areas of swelling that appear on his left armpit, right ankle, and buttocks. Related to this, pt had a swelling on  His groin that was popped and never came back recently.        The history is provided by the patient. No  was used.     Review of patient's allergies indicates:   Allergen Reactions    Hydrocodone Nausea And Vomiting    Pork/porcine containing products Nausea And Vomiting     Past Medical History:   Diagnosis Date    Bipolar 1 disorder     Constipation     Depression     Gonorrhea     Schizophrenia      History reviewed. No pertinent surgical history.  History reviewed. No pertinent family history.  Social History     Tobacco Use    Smoking status:  Current Every Day Smoker     Packs/day: 1.00     Types: Cigarettes    Smokeless tobacco: Never Used   Substance Use Topics    Alcohol use: Yes     Alcohol/week: 0.6 oz     Types: 1 Shots of liquor per week     Comment: occasionally beer    Drug use: No     Review of Systems   Constitutional: Negative for chills and fever.   HENT: Negative for congestion, rhinorrhea and sore throat.    Eyes: Negative for discharge and visual disturbance.   Respiratory: Negative for cough and shortness of breath.    Cardiovascular: Negative for chest pain.   Gastrointestinal: Positive for abdominal pain and constipation. Negative for diarrhea, nausea and vomiting.   Genitourinary: Positive for difficulty urinating, flank pain and testicular pain. Negative for discharge, dysuria, frequency and hematuria.   Musculoskeletal: Negative for back pain and myalgias.   Skin: Negative for rash.        Positive for swelling to left armpit, rt ankle, and buttocks   Neurological: Negative for dizziness, weakness and headaches.   Psychiatric/Behavioral: Negative for confusion.       Physical Exam     Initial Vitals [06/20/19 0646]   BP Pulse Resp Temp SpO2   117/68 64 18 98 °F (36.7 °C) 97 %      MAP       --         Physical Exam    Nursing note and vitals reviewed.  Constitutional: He appears well-developed and well-nourished. He is not diaphoretic. No distress.   HENT:   Head: Normocephalic and atraumatic.   Nose: Nose normal.   Mouth/Throat: Oropharynx is clear and moist.   Eyes: EOM are normal. Pupils are equal, round, and reactive to light.   Neck: Normal range of motion. Neck supple.   Cardiovascular: Normal rate, regular rhythm and normal heart sounds. Exam reveals no gallop and no friction rub.    No murmur heard.  Pulmonary/Chest: Breath sounds normal. No respiratory distress. He has no wheezes. He has no rhonchi. He has no rales.   Abdominal: Soft. Bowel sounds are normal. There is tenderness in the left upper quadrant and left lower  quadrant. There is CVA tenderness (left). There is no rebound and no guarding. Hernia confirmed negative in the right inguinal area and confirmed negative in the left inguinal area.   Genitourinary: Testes normal and penis normal. Cremasteric reflex is present. Right testis shows no mass, no swelling and no tenderness. Left testis shows no mass, no swelling and no tenderness. Circumcised. No penile tenderness. No discharge found.   Genitourinary Comments: Venkata Ta at bedside for  examination.     Musculoskeletal: Normal range of motion.   Lymphadenopathy: Inguinal adenopathy noted on the left side. No inguinal adenopathy noted on the right side.   Neurological: He is alert and oriented to person, place, and time. He has normal strength. No cranial nerve deficit or sensory deficit.   Skin: Skin is warm and dry. Capillary refill takes less than 2 seconds.   There are multiple healing lesions to the bilateral upper and bilateral lower extremities. No purulent drainage. No erythema or induration.   Psychiatric: He has a normal mood and affect.         ED Course   Procedures  Labs Reviewed   URINALYSIS, REFLEX TO URINE CULTURE - Abnormal; Notable for the following components:       Result Value    Specific Gravity, UA >1.030 (*)     All other components within normal limits    Narrative:     Preferred Collection Type->Urine, Clean Catch   CBC W/ AUTO DIFFERENTIAL - Abnormal; Notable for the following components:    Mean Corpuscular Hemoglobin 31.2 (*)     All other components within normal limits   COMPREHENSIVE METABOLIC PANEL - Abnormal; Notable for the following components:    BUN, Bld 21 (*)     All other components within normal limits   LIPASE - Abnormal; Notable for the following components:    Lipase 241 (*)     All other components within normal limits   URINALYSIS MICROSCOPIC    Narrative:     Preferred Collection Type->Urine, Clean Catch          Imaging Results    None          Medical Decision  Making:   ED Management:  This is an evaluation of a 28 y.o. male who presents to the ED for abdominal pain and flank pain.  Vital signs are stable.   Afebrile.  Patient is nontoxic appearing and in no acute distress. There is mild left upper and left lower quadrant abdominal pain. There is left CVA tenderness. There are multiple healing lead to the bilateral upper bilateral lower extremities.  No induration, fluctuance or erythema.  No active drainage. Lungs clear to auscultation.  Heart sounds are normal. CBC shows leukocytosis.  H&H stable. CMP shows no significant electrolyte abnormalities.  UA shows no signs of infection.  Lipase elevated at 241.  Patient treated in the ED with IV fluids, Zofran and Tylenol with resolution of symptoms. Patient tolerating p.o. in the ED.  Given clinical presentation, I do not believe the patient requires hospital admission for acute pancreatitis.  Will discharge patient home to follow up with Gastroenterology.  Will discharge patient home with Zofran and Colace.    Patient given return precautions and instructed to return to the emergency department for any new or worsening symptoms. Patient verbalized understanding and agreed with plan.     I discussed the case with Dr. Soto who is in agreement with my assessment and plan.              Scribe Attestation:   Scribe #1: I performed the above scribed service and the documentation accurately describes the services I performed. I attest to the accuracy of the note.    Scribe attestation: I, Juan J Flores , personally performed the services described in this documentation. All medical record entries made by the scribe were at my direction and in my presence. I have reviewed the chart and agree that the record reflects my personal performance and is accurate and complete               Clinical Impression:       ICD-10-CM ICD-9-CM   1. Elevated lipase R74.8 790.5   2. Constipation, unspecified constipation type K59.00 564.00   3.  Abscess of multiple sites L02.91 682.9                                Juan J Flores PA-C  06/20/19 1122       Juan J Flores PA-C  06/20/19 1123

## 2019-08-07 ENCOUNTER — HOSPITAL ENCOUNTER (EMERGENCY)
Facility: HOSPITAL | Age: 29
Discharge: HOME | End: 2019-08-07
Attending: EMERGENCY MEDICINE
Payer: MEDICAID

## 2019-08-07 VITALS
SYSTOLIC BLOOD PRESSURE: 114 MMHG | HEART RATE: 66 BPM | RESPIRATION RATE: 17 BRPM | DIASTOLIC BLOOD PRESSURE: 62 MMHG | TEMPERATURE: 98 F | WEIGHT: 190 LBS | BODY MASS INDEX: 26.6 KG/M2 | HEIGHT: 71 IN | OXYGEN SATURATION: 100 %

## 2019-08-07 DIAGNOSIS — K59.00 CONSTIPATION, UNSPECIFIED CONSTIPATION TYPE: ICD-10-CM

## 2019-08-07 DIAGNOSIS — R11.0 NAUSEA: ICD-10-CM

## 2019-08-07 DIAGNOSIS — R10.9 RIGHT FLANK PAIN: Primary | ICD-10-CM

## 2019-08-07 LAB
BILIRUB UR QL STRIP: NEGATIVE
CLARITY UR: CLEAR
COLOR UR: NORMAL
GLUCOSE UR QL STRIP: NEGATIVE
HGB UR QL STRIP: NEGATIVE
KETONES UR QL STRIP: NEGATIVE
LEUKOCYTE ESTERASE UR QL STRIP: NEGATIVE
NITRITE UR QL STRIP: NEGATIVE
PH UR STRIP: 6 [PH] (ref 5–8)
PROT UR QL STRIP: NEGATIVE
SP GR UR STRIP: 1.01 (ref 1–1.03)
URN SPEC COLLECT METH UR: NORMAL
UROBILINOGEN UR STRIP-ACNC: NEGATIVE EU/DL

## 2019-08-07 PROCEDURE — 96374 THER/PROPH/DIAG INJ IV PUSH: CPT

## 2019-08-07 PROCEDURE — 96375 TX/PRO/DX INJ NEW DRUG ADDON: CPT

## 2019-08-07 PROCEDURE — 96361 HYDRATE IV INFUSION ADD-ON: CPT

## 2019-08-07 PROCEDURE — 63600175 PHARM REV CODE 636 W HCPCS: Performed by: EMERGENCY MEDICINE

## 2019-08-07 PROCEDURE — 99284 EMERGENCY DEPT VISIT MOD MDM: CPT | Mod: 25

## 2019-08-07 PROCEDURE — 81003 URINALYSIS AUTO W/O SCOPE: CPT

## 2019-08-07 RX ORDER — KETOROLAC TROMETHAMINE 30 MG/ML
30 INJECTION, SOLUTION INTRAMUSCULAR; INTRAVENOUS
Status: COMPLETED | OUTPATIENT
Start: 2019-08-07 | End: 2019-08-07

## 2019-08-07 RX ORDER — ONDANSETRON 4 MG/1
4 TABLET, FILM COATED ORAL EVERY 8 HOURS PRN
Qty: 12 TABLET | Refills: 0 | OUTPATIENT
Start: 2019-08-07 | End: 2023-08-25

## 2019-08-07 RX ORDER — ONDANSETRON 2 MG/ML
4 INJECTION INTRAMUSCULAR; INTRAVENOUS
Status: COMPLETED | OUTPATIENT
Start: 2019-08-07 | End: 2019-08-07

## 2019-08-07 RX ORDER — IBUPROFEN 600 MG/1
600 TABLET ORAL EVERY 6 HOURS PRN
Qty: 20 TABLET | Refills: 0 | OUTPATIENT
Start: 2019-08-07 | End: 2023-08-25

## 2019-08-07 RX ORDER — SULFAMETHOXAZOLE AND TRIMETHOPRIM 800; 160 MG/1; MG/1
1 TABLET ORAL
COMMUNITY
End: 2019-09-23

## 2019-08-07 RX ORDER — POLYETHYLENE GLYCOL 3350 17 G/17G
17 POWDER, FOR SOLUTION ORAL 3 TIMES DAILY PRN
Qty: 24 PACKET | Refills: 0 | Status: SHIPPED | OUTPATIENT
Start: 2019-08-07

## 2019-08-07 RX ADMIN — KETOROLAC TROMETHAMINE 30 MG: 30 INJECTION, SOLUTION INTRAMUSCULAR; INTRAVENOUS at 08:08

## 2019-08-07 RX ADMIN — SODIUM CHLORIDE 1000 ML: 0.9 INJECTION, SOLUTION INTRAVENOUS at 07:08

## 2019-08-07 RX ADMIN — ONDANSETRON 4 MG: 2 INJECTION INTRAMUSCULAR; INTRAVENOUS at 08:08

## 2019-08-07 NOTE — ED PROVIDER NOTES
Encounter Date: 8/7/2019    SCRIBE #1 NOTE: I, Dorita Garcia, am scribing for, and in the presence of,  Julien Harmon MD. I have scribed the following portions of the note - Other sections scribed: HPI, ROS.       History     Chief Complaint   Patient presents with    Abdominal Pain     Pt c/o RUQ abdominal pain that started 3 to 4 day. Denies NVD at this time.     CC: Abdominal pain    HPI: This is a 28 y.o. M who has Hx of Pancreatitis who presents to the ED for emergent evaluation of acute and intermittent abdominal pain to the right lateral aspect x's 3 days. Pt has associated slight nausea. There are no exacerbating or alleviating factors of the abdominal pain. Pt also reports an irritable sensation prior to voiding urine, but he denies dysuria. Pt had an episode of cold sweats this morning, and an episode of numbness in bilateral upper lower and extremities yesterday that as since resolved. Pt denies fever, chills, ear pain, sore throat, eye problem, cough, SOB, CP, vomiting, diarrhea, back pain, arm or leg problems, rash, or headaches.     The history is provided by the patient. No  was used.     Review of patient's allergies indicates:   Allergen Reactions    Hydrocodone Nausea And Vomiting    Pork/porcine containing products Nausea And Vomiting     Past Medical History:   Diagnosis Date    Bipolar 1 disorder     Constipation     Depression     Gonorrhea     Pancreatitis     Schizophrenia      History reviewed. No pertinent surgical history.  No family history on file.  Social History     Tobacco Use    Smoking status: Current Every Day Smoker     Packs/day: 1.00     Types: Cigarettes    Smokeless tobacco: Never Used   Substance Use Topics    Alcohol use: Yes     Alcohol/week: 0.6 oz     Types: 1 Shots of liquor per week     Comment: occasionally beer    Drug use: No     Review of Systems   Constitutional: Negative for chills and fever.   HENT: Negative for ear pain and  sore throat.    Eyes: Negative for pain.   Respiratory: Negative for cough and shortness of breath.    Cardiovascular: Negative for chest pain.   Gastrointestinal: Positive for abdominal pain and nausea. Negative for diarrhea and vomiting.   Genitourinary: Negative for dysuria.        (+) Irritable sensation prior to voiding urine   Musculoskeletal: Negative for back pain.        (-) arm or leg problems   Skin: Negative for rash.   Neurological: Negative for weakness, numbness and headaches.   Hematological: Does not bruise/bleed easily.       Physical Exam     Initial Vitals [08/07/19 0322]   BP Pulse Resp Temp SpO2   111/69 (!) 43 18 98 °F (36.7 °C) 99 %      MAP       --         Physical Exam  The patient was examined specifically for the following:   General:No significant distress, Good color, Warm and dry. Head and neck:Scalp atraumatic, Neck supple. Neurological:Appropriate conversation, Gross motor deficits. Eyes:Conjugate gaze, Clear corneas. ENT: No epistaxis. Cardiac: Regular rate and rhythm, Grossly normal heart tones. Pulmonary: Wheezing, Rales. Gastrointestinal: Abdominal tenderness, Abdominal distention. Musculoskeletal: Extremity deformity, Apparent pain with range of motion of the joints. Skin: Rash.   The findings on examination were normal except for the following:  The patient has some point tenderness just above the iliac crest in the mid axillary line.  There is minimal left lower quadrant abdominal tenderness but no guarding rebound mass or distention.  The lungs are clear.  The heart tones are normal.  The abdomen is soft.  There is no tenderness in the left upper quadrant.  ED Course   Procedures  Labs Reviewed   URINALYSIS, REFLEX TO URINE CULTURE    Narrative:     Preferred Collection Type->Urine, Clean Catch          Imaging Results          CT Abdomen Pelvis  Without Contrast (Final result)  Result time 08/07/19 08:26:20    Final result by Clemente Abreu MD (08/07/19 08:26:20)                  Impression:      1. No emergent abdominopelvic findings appreciated.  2. Again noted is a large amount of desiccated stool distending the entirety of the colon which could correlate with the clinical diagnosis of constipation and may be contributing to the patient's presenting symptoms.  3. Cluster of prominent but nonenlarged lymph nodes in the right lower quadrant/ileocecal mesentery without appreciable abnormality of the adjacent bowel on this limited nonenhanced and unopacified exam.  Finding is nonspecific and may be clinically insignificant however, can be seen in the setting of mesenteric adenitis/enteritis.      Electronically signed by: Clemente Abreu  Date:    08/07/2019  Time:    08:26             Narrative:    EXAMINATION:  CT ABDOMEN PELVIS WITHOUT CONTRAST    CLINICAL HISTORY:  Flank pain, not otherwise specified as to laterality, with concerns for obstructing nephrolith..    TECHNIQUE:  Low dose axial images, sagittal and coronal reformations were obtained from the lung bases to the pubic symphysis. IV and oral contrast were not utilized, limiting evaluation of vascular structures as well as soft tissue and hollow viscus organs.    COMPARISON:  CT abdomen/pelvis 04/10/2019    FINDINGS:  Heart: Heart is not enlarged. No significant pericardial thickening in the field of view.    Lung Bases: Imaged portions of the lung bases are clear.    Hepatobiliary: Normal in size, attenuation and morphology without appreciable surface nodularity. No sizable suspicious mass on this limited non-enhanced exam.  Gallbladder is grossly unremarkable without radiodense stones, mural thickening, pericholecystic fluid or pericholecystic fat stranding.  No biliary dilatation.    Pancreas: Pancreas is normal in size and morphology without appreciable surrounding fat stranding, sizable fluid or sizable suspicious mass on this partially limited non-enhanced exam.    Spleen: Spleen is normal in size and morphology without  appreciable sizable parenchymal mass on this non-enhanced exam.    Adrenals: Adrenal glands are symmetric and normal in size and morphology without appreciable nodularity.    Kidneys: Kidneys are normal in location, size, attenuation and morphology. No obstructing nephrolith or hydroureteronephrosis bilaterally.  No appreciable sizable suspicious mass on this limited non-enhanced exam.    Bladder: Urinary bladder is well distended and is grossly unremarkable without evidence of mural thickening or nodularity.    GI Tract: Large amount of desiccated stool is noted throughout the colon.  Otherwise, the unenhanced and unopacified stomach, small bowel and colon are normal in course and caliber without evidence of bowel obstruction. No significant fat stranding. No free air. No free fluid. Appendix is normal.    Mesentery: A cluster of prominent but nonenlarged lymph nodes in the right lower quadrant/ileocolic mesentery without appreciable abnormality of the adjacent bowel on this limited nonenhanced and unopacified exam.    Retroperitoneum: Aorta is normal in course and caliber without evidence of aneurysmal degeneration.  No sizable suspicious retroperitoneal mass, fluid collection or lymphadenopathy.    Pelvis: Reproductive organs are grossly unremarkable.  No appreciable sizable pelvic mass on this partially limited nonenhanced exam.  No appreciable free fluid or organized/drainable fluid collection.  No pelvic lymphadenopathy.    Soft tissues: Mild left-sided gynecomastia.    Osseous structures: Imaged osseous structures are grossly unremarkable.  No acute displaced fracture, dislocation or suspicious lytic/blastic osseous lesions.                                 I,   , personally performed the services described in this documentation. All medical record entries made by the scribe were at my direction and in my presence. I have reviewed the chart and agree that the record reflects my personal performance and is  accurate and complete.          Medical decision making:  Given the above, this patient presents with right-sided flank pain.  He has some mild nausea.  He has some nonspecific urinary symptoms. CT fails to reveal ureteral calculus.  There is no evidence of urinary tract infection or pyelonephritis.  Clinically there is no evidence of peritonitis.  CT does reveal a lot of stool in the colon.  I will treat the patient with ibuprofen and laxatives and have him follow up with primary care.  I will give him Zofran for nausea.  There is some evidence of some mesenteric adenitis.  The patient is not tender in the right lower quadrant.  I specifically doubt appendicitis he is not tender in the left upper quadrant I specifically doubt pancreatitis.                 Clinical Impression:       ICD-10-CM ICD-9-CM   1. Right flank pain R10.9 789.09   2. Constipation, unspecified constipation type K59.00 564.00   3. Nausea R11.0 787.02                                Julien Harmon MD  08/07/19 0914

## 2019-08-07 NOTE — ED TRIAGE NOTES
Here for eval or right sided abd pain onset 3 days, pain worse this am. Also c/o pain in the right side when urinating. Was dx with pancreatitis 2 years ago. Denies hematuria, urine described as light, feels a tingling sensation in muscles when urinating, denies vomiting or diarrhea

## 2019-08-07 NOTE — DISCHARGE INSTRUCTIONS
Lots of liquids.  Please use the MiraLax 3 times a day until you have 2 large bowel movements.  Zofran for nausea.  Ibuprofen for pain.  Use a heating pad.  Rest.  Please follow-up with your primary care doctor in 48-72 hr.

## 2019-09-22 ENCOUNTER — HOSPITAL ENCOUNTER (EMERGENCY)
Facility: HOSPITAL | Age: 29
Discharge: HOME | End: 2019-09-23
Attending: EMERGENCY MEDICINE
Payer: MEDICAID

## 2019-09-22 DIAGNOSIS — N50.82 SCROTUM PAIN: ICD-10-CM

## 2019-09-22 DIAGNOSIS — R10.9 ABDOMINAL PAIN, UNSPECIFIED ABDOMINAL LOCATION: Primary | ICD-10-CM

## 2019-09-22 LAB
ALBUMIN SERPL BCP-MCNC: 3.6 G/DL (ref 3.5–5.2)
ALP SERPL-CCNC: 82 U/L (ref 55–135)
ALT SERPL W/O P-5'-P-CCNC: 16 U/L (ref 10–44)
ANION GAP SERPL CALC-SCNC: 8 MMOL/L (ref 8–16)
AST SERPL-CCNC: 21 U/L (ref 10–40)
BASOPHILS # BLD AUTO: 0.05 K/UL (ref 0–0.2)
BASOPHILS NFR BLD: 0.6 % (ref 0–1.9)
BILIRUB SERPL-MCNC: 0.6 MG/DL (ref 0.1–1)
BILIRUB UR QL STRIP: NEGATIVE
BUN SERPL-MCNC: 10 MG/DL (ref 6–20)
CALCIUM SERPL-MCNC: 9 MG/DL (ref 8.7–10.5)
CHLORIDE SERPL-SCNC: 105 MMOL/L (ref 95–110)
CLARITY UR: CLEAR
CO2 SERPL-SCNC: 26 MMOL/L (ref 23–29)
COLOR UR: YELLOW
CREAT SERPL-MCNC: 0.8 MG/DL (ref 0.5–1.4)
DIFFERENTIAL METHOD: ABNORMAL
EOSINOPHIL # BLD AUTO: 0.1 K/UL (ref 0–0.5)
EOSINOPHIL NFR BLD: 1.6 % (ref 0–8)
ERYTHROCYTE [DISTWIDTH] IN BLOOD BY AUTOMATED COUNT: 13.8 % (ref 11.5–14.5)
EST. GFR  (AFRICAN AMERICAN): >60 ML/MIN/1.73 M^2
EST. GFR  (NON AFRICAN AMERICAN): >60 ML/MIN/1.73 M^2
GLUCOSE SERPL-MCNC: 95 MG/DL (ref 70–110)
GLUCOSE UR QL STRIP: NEGATIVE
HCT VFR BLD AUTO: 41.5 % (ref 40–54)
HGB BLD-MCNC: 13.7 G/DL (ref 14–18)
HGB UR QL STRIP: NEGATIVE
KETONES UR QL STRIP: NEGATIVE
LEUKOCYTE ESTERASE UR QL STRIP: NEGATIVE
LIPASE SERPL-CCNC: 57 U/L (ref 4–60)
LYMPHOCYTES # BLD AUTO: 3.6 K/UL (ref 1–4.8)
LYMPHOCYTES NFR BLD: 41.2 % (ref 18–48)
MCH RBC QN AUTO: 30.9 PG (ref 27–31)
MCHC RBC AUTO-ENTMCNC: 33 G/DL (ref 32–36)
MCV RBC AUTO: 94 FL (ref 82–98)
MONOCYTES # BLD AUTO: 1.2 K/UL (ref 0.3–1)
MONOCYTES NFR BLD: 13.7 % (ref 4–15)
NEUTROPHILS # BLD AUTO: 3.8 K/UL (ref 1.8–7.7)
NEUTROPHILS NFR BLD: 42.9 % (ref 38–73)
NITRITE UR QL STRIP: NEGATIVE
PH UR STRIP: 6 [PH] (ref 5–8)
PLATELET # BLD AUTO: 170 K/UL (ref 150–350)
PMV BLD AUTO: 9.8 FL (ref 9.2–12.9)
POTASSIUM SERPL-SCNC: 4.2 MMOL/L (ref 3.5–5.1)
PROT SERPL-MCNC: 6.5 G/DL (ref 6–8.4)
PROT UR QL STRIP: NEGATIVE
RBC # BLD AUTO: 4.43 M/UL (ref 4.6–6.2)
SODIUM SERPL-SCNC: 139 MMOL/L (ref 136–145)
SP GR UR STRIP: 1.01 (ref 1–1.03)
URN SPEC COLLECT METH UR: NORMAL
UROBILINOGEN UR STRIP-ACNC: NEGATIVE EU/DL
WBC # BLD AUTO: 8.78 K/UL (ref 3.9–12.7)

## 2019-09-22 PROCEDURE — 87491 CHLMYD TRACH DNA AMP PROBE: CPT

## 2019-09-22 PROCEDURE — 80320 DRUG SCREEN QUANTALCOHOLS: CPT

## 2019-09-22 PROCEDURE — 83690 ASSAY OF LIPASE: CPT

## 2019-09-22 PROCEDURE — 96372 THER/PROPH/DIAG INJ SC/IM: CPT

## 2019-09-22 PROCEDURE — 96375 TX/PRO/DX INJ NEW DRUG ADDON: CPT | Mod: 59

## 2019-09-22 PROCEDURE — 85025 COMPLETE CBC W/AUTO DIFF WBC: CPT

## 2019-09-22 PROCEDURE — 96374 THER/PROPH/DIAG INJ IV PUSH: CPT

## 2019-09-22 PROCEDURE — 82550 ASSAY OF CK (CPK): CPT

## 2019-09-22 PROCEDURE — 81003 URINALYSIS AUTO W/O SCOPE: CPT

## 2019-09-22 PROCEDURE — 96361 HYDRATE IV INFUSION ADD-ON: CPT

## 2019-09-22 PROCEDURE — 80053 COMPREHEN METABOLIC PANEL: CPT

## 2019-09-22 PROCEDURE — 99284 EMERGENCY DEPT VISIT MOD MDM: CPT | Mod: 25

## 2019-09-23 VITALS
SYSTOLIC BLOOD PRESSURE: 121 MMHG | DIASTOLIC BLOOD PRESSURE: 66 MMHG | HEIGHT: 70 IN | RESPIRATION RATE: 17 BRPM | BODY MASS INDEX: 28.92 KG/M2 | OXYGEN SATURATION: 99 % | WEIGHT: 202 LBS | TEMPERATURE: 99 F | HEART RATE: 51 BPM

## 2019-09-23 LAB
AMPHET+METHAMPHET UR QL: NEGATIVE
BARBITURATES UR QL SCN>200 NG/ML: NEGATIVE
BENZODIAZ UR QL SCN>200 NG/ML: NEGATIVE
BZE UR QL SCN: NEGATIVE
C TRACH DNA SPEC QL NAA+PROBE: NOT DETECTED
CANNABINOIDS UR QL SCN: NORMAL
CK SERPL-CCNC: 173 U/L (ref 20–200)
CREAT UR-MCNC: 250.9 MG/DL (ref 23–375)
ETHANOL SERPL-MCNC: <10 MG/DL
METHADONE UR QL SCN>300 NG/ML: NEGATIVE
N GONORRHOEA DNA SPEC QL NAA+PROBE: NOT DETECTED
OPIATES UR QL SCN: NEGATIVE
PCP UR QL SCN>25 NG/ML: NEGATIVE
TOXICOLOGY INFORMATION: NORMAL

## 2019-09-23 PROCEDURE — 63600175 PHARM REV CODE 636 W HCPCS: Performed by: EMERGENCY MEDICINE

## 2019-09-23 PROCEDURE — 25000003 PHARM REV CODE 250: Performed by: EMERGENCY MEDICINE

## 2019-09-23 PROCEDURE — 80307 DRUG TEST PRSMV CHEM ANLYZR: CPT

## 2019-09-23 RX ORDER — AZITHROMYCIN 250 MG/1
1000 TABLET, FILM COATED ORAL
Status: COMPLETED | OUTPATIENT
Start: 2019-09-23 | End: 2019-09-23

## 2019-09-23 RX ORDER — DICYCLOMINE HYDROCHLORIDE 20 MG/1
20 TABLET ORAL 4 TIMES DAILY PRN
Qty: 20 TABLET | Refills: 0 | Status: SHIPPED | OUTPATIENT
Start: 2019-09-23 | End: 2019-10-23

## 2019-09-23 RX ORDER — ONDANSETRON 2 MG/ML
4 INJECTION INTRAMUSCULAR; INTRAVENOUS
Status: COMPLETED | OUTPATIENT
Start: 2019-09-23 | End: 2019-09-23

## 2019-09-23 RX ORDER — ONDANSETRON 4 MG/1
4 TABLET, ORALLY DISINTEGRATING ORAL EVERY 4 HOURS PRN
Qty: 20 TABLET | Refills: 1 | Status: SHIPPED | OUTPATIENT
Start: 2019-09-23

## 2019-09-23 RX ORDER — KETOROLAC TROMETHAMINE 30 MG/ML
30 INJECTION, SOLUTION INTRAMUSCULAR; INTRAVENOUS
Status: COMPLETED | OUTPATIENT
Start: 2019-09-23 | End: 2019-09-23

## 2019-09-23 RX ORDER — CEFTRIAXONE 250 MG/1
250 INJECTION, POWDER, FOR SOLUTION INTRAMUSCULAR; INTRAVENOUS
Status: COMPLETED | OUTPATIENT
Start: 2019-09-23 | End: 2019-09-23

## 2019-09-23 RX ORDER — DOXYCYCLINE 100 MG/1
100 CAPSULE ORAL 2 TIMES DAILY
Qty: 20 CAPSULE | Refills: 0 | Status: SHIPPED | OUTPATIENT
Start: 2019-09-23 | End: 2019-10-03

## 2019-09-23 RX ADMIN — AZITHROMYCIN 1000 MG: 250 TABLET, FILM COATED ORAL at 04:09

## 2019-09-23 RX ADMIN — KETOROLAC TROMETHAMINE 30 MG: 30 INJECTION, SOLUTION INTRAMUSCULAR; INTRAVENOUS at 01:09

## 2019-09-23 RX ADMIN — CEFTRIAXONE SODIUM 250 MG: 250 INJECTION, POWDER, FOR SOLUTION INTRAMUSCULAR; INTRAVENOUS at 04:09

## 2019-09-23 RX ADMIN — SODIUM CHLORIDE 1000 ML: 0.9 INJECTION, SOLUTION INTRAVENOUS at 01:09

## 2019-09-23 RX ADMIN — ONDANSETRON HYDROCHLORIDE 4 MG: 2 SOLUTION INTRAMUSCULAR; INTRAVENOUS at 01:09

## 2019-09-23 NOTE — ED PROVIDER NOTES
"Encounter Date: 9/22/2019       History     Chief Complaint   Patient presents with    Abdominal Pain     pt c/o abdominal pain with nv starting at 11:00 am     30 yo male presents via personal transportation (wife) with abdominal pain, nausea, vomiting, and left groin pain. Patient was in his usual state of health until around 11am today, when he developed abdominal pain radiating across his mid-abdomen and left groin pain. Pain is crampy and waxing/waning. He became nauseated and vomited; after vomiting, he also developed "heartburn" type pain. Last BM was earlier today and was difficult to push out. Urine is dark and patient also feels like he is not emptying his bladder fully, but patient denies dysuria or penile discharge. He has pain to his left testis and to his penis; he denies sexual trauma. Patient reports he just got back together with his wife whose vagina has an odor to it.    Wife ate the same breakfast as patient and is not ill. 15 year old son has rhinorrhea but no GI symptoms. Patient works as  and denies intolerance of physical activity.    PMH: hemorrhoids    Psych: bipolar    Smokes tobacco.         Review of patient's allergies indicates:   Allergen Reactions    Hydrocodone Nausea And Vomiting    Pork/porcine containing products Nausea And Vomiting     Past Medical History:   Diagnosis Date    Bipolar 1 disorder     Constipation     Depression     Gonorrhea     Pancreatitis     Schizophrenia      History reviewed. No pertinent surgical history.  History reviewed. No pertinent family history.  Social History     Tobacco Use    Smoking status: Current Every Day Smoker     Packs/day: 1.00     Types: Cigarettes    Smokeless tobacco: Never Used   Substance Use Topics    Alcohol use: Not Currently     Alcohol/week: 1.0 standard drinks     Types: 1 Shots of liquor per week     Comment: occasionally beer    Drug use: No     Review of Systems   Constitutional: Negative for fever. "   HENT: Negative for sore throat.    Eyes: Negative for visual disturbance.   Respiratory: Negative for cough and shortness of breath.    Cardiovascular: Negative for palpitations.   Gastrointestinal: Positive for abdominal pain, nausea and vomiting.   Genitourinary: Positive for penile pain and testicular pain.   Musculoskeletal: Negative for neck pain.   Skin: Negative for rash.   Neurological: Negative for light-headedness.       Physical Exam     Initial Vitals [09/22/19 2220]   BP Pulse Resp Temp SpO2   126/75 61 18 97.7 °F (36.5 °C) 98 %      MAP       --         Physical Exam    Nursing note and vitals reviewed.  Constitutional: He appears well-developed and well-nourished. He is not diaphoretic.   Awake, alert, nontoxic.   HENT:   Head: Normocephalic and atraumatic.   Mouth/Throat: Oropharynx is clear and moist.   Eyes: Conjunctivae and EOM are normal. Pupils are equal, round, and reactive to light.   Neck: Normal range of motion. Neck supple.   Cardiovascular: Normal rate, regular rhythm, normal heart sounds and intact distal pulses.   No murmur heard.  Pulmonary/Chest: Breath sounds normal. No respiratory distress. He has no wheezes. He has no rhonchi. He has no rales.   Abdominal: Soft. There is tenderness (diffuse lower abdominal TTP). There is no rebound and no guarding.   Genitourinary: No discharge found.   Genitourinary Comments: Circumcised. Mild diffuse tenderness to base of penis. No discharge. No lesions. No scrotal masses. No hernia. +mild left scrotal TTP without swelling.    Musculoskeletal: Normal range of motion. He exhibits no edema or tenderness.   Neurological: He is alert and oriented to person, place, and time. He has normal strength.   Moving all extremities   Skin: Skin is warm and dry.   Psychiatric: He has a normal mood and affect.         ED Course   Procedures  Labs Reviewed   CBC W/ AUTO DIFFERENTIAL - Abnormal; Notable for the following components:       Result Value    RBC 4.43  (*)     Hemoglobin 13.7 (*)     Mono # 1.2 (*)     All other components within normal limits   C. TRACHOMATIS/N. GONORRHOEAE BY AMP DNA    Narrative:     Sources by Resulting Lab:->[facility]   COMPREHENSIVE METABOLIC PANEL   LIPASE   URINALYSIS, REFLEX TO URINE CULTURE    Narrative:     Preferred Collection Type->Urine, Clean Catch   DRUG SCREEN PANEL, URINE EMERGENCY    Narrative:     Recoll. [id number] by [facility] at 09/23/2019 01:41, reason: Insufficient   specimen, spoke with Asha YEBOAH   ALCOHOL,MEDICAL (ETHANOL)          Imaging Results          US Scrotum And Testicles (Final result)  Result time 09/23/19 03:08:56    Final result by Darryn Miller MD (09/23/19 03:08:56)                 Impression:      Small bilateral hydroceles and subcentimeter epididymal head cysts.  No sonographic evidence of testicular torsion.      Electronically signed by: Darryn Miller MD  Date:    09/23/2019  Time:    03:08             Narrative:    EXAMINATION:  US SCROTUM AND TESTICLES    CLINICAL HISTORY:  Scrotal pain    TECHNIQUE:  Sonography of the scrotum and testes.    COMPARISON:  Scrotal ultrasound 04/10/2019    FINDINGS:  Right Testicle:    *Size: 3.7 x 2.0 x 3.4 cm  *Appearance: Normal.  *Flow: Normal arterial and venous flow  *Epididymis: There is a small subcentimeter epididymal head cyst present.  The epididymis otherwise appears within normal limits.  *Hydrocele: There is a small hydrocele.  *Varicocele: None.  .    Left Testicle:    *Size: 3.3 x 2.4 x 3.8 cm  *Appearance: Normal.  *Flow: Normal arterial and venous flow  *Epididymis: There is a small subcentimeter epididymal head cyst present.  The epididymis otherwise appears within normal limits.  *Hydrocele: There is a small hydrocele.  *Varicocele: None.  .    Other findings: None.                                 Medical Decision Making:   History:   Old Medical Records: I decided to obtain old medical records.  Old Records Summarized: records from previous  "admission(s) and records from another hospital.  Clinical Tests:   Lab Tests: Ordered and Reviewed  Radiological Study: Ordered and Reviewed  ED Management:  28yo male with abdominal pain, nausea, vomiting, left groin/testicular pain. Also concern for STI exposure from new sex partner (wife with whom he just got back together).    Differential includes diverticulitis, UTI, pyelonephritis, dehydration, MISTY, testicular torsion, epididymo-orchitis, STI, other.     Labs reassuring.    Ultrasound scrotum: "Small bilateral hydroceles and subcentimeter epididymal head cysts." No torsion.    Patient had NS bolus, toradol, zofran. Subsequently his abdominal pain had resolved and his scrotal pain had improved.    [facility], patient has had prior visits with similar symptoms -- abdominal pain and scrotal pain -- to here and [facility] going back some years. His workups have overall been reassuring.     I explained negative workup to patient, but he remains concerned re: his  symptoms, and requested STI tx, which I provided. I have instructed him to discuss his concerns with his wife, and to insure that both are treated and tested negative prior to resuming intercourse. I have emphasized condom use as well.     F/u GI for recurrent abdominal pain.                       Clinical Impression:       ICD-10-CM ICD-9-CM   1. Abdominal pain, unspecified abdominal location R10.9 789.00   2. Scrotum pain N50.82 608.9                                Katerin Garcia MD  09/23/19 1654    "

## 2019-09-23 NOTE — DISCHARGE INSTRUCTIONS
Practice safe sex. Discuss your concern for sexually transmitted infection with your wife. Both of you need to be tested and treated before resuming intercourse. Use a condom with any new partners.

## 2019-09-23 NOTE — ED TRIAGE NOTES
"Pt presents to ER via personal transportation from home with c/o intermittent, periumbilical "squeezing" with N/V that started this morning. Denies fever or cold like symptoms, reports chills. Reports taking antacid without relief.   "

## 2020-08-14 ENCOUNTER — HOSPITAL ENCOUNTER (EMERGENCY)
Facility: HOSPITAL | Age: 30
Discharge: LEFT BEFORE DISCHARGE | End: 2020-08-14
Attending: EMERGENCY MEDICINE
Payer: MEDICAID

## 2020-08-14 VITALS
HEIGHT: 71 IN | BODY MASS INDEX: 30.8 KG/M2 | HEART RATE: 75 BPM | DIASTOLIC BLOOD PRESSURE: 85 MMHG | WEIGHT: 220 LBS | OXYGEN SATURATION: 99 % | TEMPERATURE: 98 F | SYSTOLIC BLOOD PRESSURE: 127 MMHG | RESPIRATION RATE: 18 BRPM
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
## 2020-08-14 DIAGNOSIS — R53.1 RIGHT SIDED WEAKNESS: ICD-10-CM

## 2020-08-14 DIAGNOSIS — N17.9 AKI (ACUTE KIDNEY INJURY): Primary | ICD-10-CM

## 2020-08-14 DIAGNOSIS — S09.90XA INJURY OF HEAD, INITIAL ENCOUNTER: ICD-10-CM

## 2020-08-14 DIAGNOSIS — R74.8 ELEVATED CPK: ICD-10-CM

## 2020-08-14 DIAGNOSIS — E86.0 DEHYDRATION: ICD-10-CM

## 2020-08-14 DIAGNOSIS — F41.9 ANXIETY: ICD-10-CM

## 2020-08-14 LAB
ALBUMIN SERPL BCP-MCNC: 5.1 G/DL (ref 3.5–5.2)
ALP SERPL-CCNC: 92 U/L (ref 55–135)
ALT SERPL W/O P-5'-P-CCNC: 18 U/L (ref 10–44)
AMMONIA PLAS-SCNC: 67 UMOL/L (ref 10–50)
AMPHET+METHAMPHET UR QL: NEGATIVE
ANION GAP SERPL CALC-SCNC: 16 MMOL/L (ref 8–16)
APAP SERPL-MCNC: <3 UG/ML (ref 10–20)
AST SERPL-CCNC: 29 U/L (ref 10–40)
BACTERIA #/AREA URNS HPF: ABNORMAL /HPF
BARBITURATES UR QL SCN>200 NG/ML: NEGATIVE
BASOPHILS # BLD AUTO: 0.04 K/UL (ref 0–0.2)
BASOPHILS NFR BLD: 0.2 % (ref 0–1.9)
BENZODIAZ UR QL SCN>200 NG/ML: NEGATIVE
BILIRUB SERPL-MCNC: 1 MG/DL (ref 0.1–1)
BILIRUB UR QL STRIP: ABNORMAL
BUN SERPL-MCNC: 14 MG/DL (ref 6–20)
BZE UR QL SCN: NEGATIVE
CALCIUM SERPL-MCNC: 10.8 MG/DL (ref 8.7–10.5)
CANNABINOIDS UR QL SCN: NEGATIVE
CHLORIDE SERPL-SCNC: 102 MMOL/L (ref 95–110)
CK SERPL-CCNC: 426 U/L (ref 20–200)
CLARITY UR: ABNORMAL
CO2 SERPL-SCNC: 22 MMOL/L (ref 23–29)
COLOR UR: ABNORMAL
CREAT SERPL-MCNC: 2.4 MG/DL (ref 0.5–1.4)
CREAT UR-MCNC: 390.1 MG/DL (ref 23–375)
DIFFERENTIAL METHOD: ABNORMAL
EOSINOPHIL # BLD AUTO: 0 K/UL (ref 0–0.5)
EOSINOPHIL NFR BLD: 0 % (ref 0–8)
ERYTHROCYTE [DISTWIDTH] IN BLOOD BY AUTOMATED COUNT: 12.6 % (ref 11.5–14.5)
EST. GFR  (AFRICAN AMERICAN): 40 ML/MIN/1.73 M^2
EST. GFR  (NON AFRICAN AMERICAN): 34 ML/MIN/1.73 M^2
ETHANOL SERPL-MCNC: <10 MG/DL
GLUCOSE SERPL-MCNC: 89 MG/DL (ref 70–110)
GLUCOSE UR QL STRIP: NEGATIVE
HCT VFR BLD AUTO: 45.2 % (ref 40–54)
HGB BLD-MCNC: 15.6 G/DL (ref 14–18)
HGB UR QL STRIP: ABNORMAL
HYALINE CASTS #/AREA URNS LPF: 0 /LPF
IMM GRANULOCYTES # BLD AUTO: 0.11 K/UL (ref 0–0.04)
IMM GRANULOCYTES NFR BLD AUTO: 0.6 % (ref 0–0.5)
KETONES UR QL STRIP: ABNORMAL
LEUKOCYTE ESTERASE UR QL STRIP: ABNORMAL
LIPASE SERPL-CCNC: 56 U/L (ref 4–60)
LYMPHOCYTES # BLD AUTO: 1.7 K/UL (ref 1–4.8)
LYMPHOCYTES NFR BLD: 8.6 % (ref 18–48)
MAGNESIUM SERPL-MCNC: 1.8 MG/DL (ref 1.6–2.6)
MCH RBC QN AUTO: 32.2 PG (ref 27–31)
MCHC RBC AUTO-ENTMCNC: 34.5 G/DL (ref 32–36)
MCV RBC AUTO: 93 FL (ref 82–98)
METHADONE UR QL SCN>300 NG/ML: NEGATIVE
MICROSCOPIC COMMENT: ABNORMAL
MONOCYTES # BLD AUTO: 1.6 K/UL (ref 0.3–1)
MONOCYTES NFR BLD: 7.8 % (ref 4–15)
NEUTROPHILS # BLD AUTO: 16.4 K/UL (ref 1.8–7.7)
NEUTROPHILS NFR BLD: 82.8 % (ref 38–73)
NITRITE UR QL STRIP: NEGATIVE
NRBC BLD-RTO: 0 /100 WBC
OPIATES UR QL SCN: NEGATIVE
PCP UR QL SCN>25 NG/ML: NEGATIVE
PH UR STRIP: 5 [PH] (ref 5–8)
PHOSPHATE SERPL-MCNC: 1.7 MG/DL (ref 2.7–4.5)
PLATELET # BLD AUTO: 219 K/UL (ref 150–350)
PMV BLD AUTO: 9.5 FL (ref 9.2–12.9)
POTASSIUM SERPL-SCNC: 3.6 MMOL/L (ref 3.5–5.1)
PROT SERPL-MCNC: 8.5 G/DL (ref 6–8.4)
PROT UR QL STRIP: ABNORMAL
RBC # BLD AUTO: 4.84 M/UL (ref 4.6–6.2)
RBC #/AREA URNS HPF: 7 /HPF (ref 0–4)
SALICYLATES SERPL-MCNC: <5 MG/DL (ref 15–30)
SARS-COV-2 RDRP RESP QL NAA+PROBE: NEGATIVE
SODIUM SERPL-SCNC: 140 MMOL/L (ref 136–145)
SP GR UR STRIP: 1.02 (ref 1–1.03)
SQUAMOUS #/AREA URNS HPF: 3 /HPF
TOXICOLOGY INFORMATION: ABNORMAL
TSH SERPL DL<=0.005 MIU/L-ACNC: 1.61 UIU/ML (ref 0.4–4)
URN SPEC COLLECT METH UR: ABNORMAL
UROBILINOGEN UR STRIP-ACNC: ABNORMAL EU/DL
WBC # BLD AUTO: 19.77 K/UL (ref 3.9–12.7)
WBC #/AREA URNS HPF: 3 /HPF (ref 0–5)

## 2020-08-14 PROCEDURE — 80329 ANALGESICS NON-OPIOID 1 OR 2: CPT

## 2020-08-14 PROCEDURE — 81000 URINALYSIS NONAUTO W/SCOPE: CPT | Mod: 59

## 2020-08-14 PROCEDURE — 82140 ASSAY OF AMMONIA: CPT

## 2020-08-14 PROCEDURE — 93010 ELECTROCARDIOGRAM REPORT: CPT | Mod: ,,, | Performed by: INTERNAL MEDICINE

## 2020-08-14 PROCEDURE — 25000003 PHARM REV CODE 250: Performed by: EMERGENCY MEDICINE

## 2020-08-14 PROCEDURE — 93010 EKG 12-LEAD: ICD-10-PCS | Mod: ,,, | Performed by: INTERNAL MEDICINE

## 2020-08-14 PROCEDURE — 63600175 PHARM REV CODE 636 W HCPCS: Performed by: EMERGENCY MEDICINE

## 2020-08-14 PROCEDURE — 85025 COMPLETE CBC W/AUTO DIFF WBC: CPT

## 2020-08-14 PROCEDURE — 84443 ASSAY THYROID STIM HORMONE: CPT

## 2020-08-14 PROCEDURE — 96374 THER/PROPH/DIAG INJ IV PUSH: CPT

## 2020-08-14 PROCEDURE — P9612 CATHETERIZE FOR URINE SPEC: HCPCS

## 2020-08-14 PROCEDURE — 93005 ELECTROCARDIOGRAM TRACING: CPT

## 2020-08-14 PROCEDURE — 80307 DRUG TEST PRSMV CHEM ANLYZR: CPT

## 2020-08-14 PROCEDURE — 96361 HYDRATE IV INFUSION ADD-ON: CPT

## 2020-08-14 PROCEDURE — 80320 DRUG SCREEN QUANTALCOHOLS: CPT

## 2020-08-14 PROCEDURE — 99285 EMERGENCY DEPT VISIT HI MDM: CPT | Mod: 25

## 2020-08-14 PROCEDURE — 84100 ASSAY OF PHOSPHORUS: CPT

## 2020-08-14 PROCEDURE — 83735 ASSAY OF MAGNESIUM: CPT

## 2020-08-14 PROCEDURE — 82550 ASSAY OF CK (CPK): CPT

## 2020-08-14 PROCEDURE — 80053 COMPREHEN METABOLIC PANEL: CPT

## 2020-08-14 PROCEDURE — 83690 ASSAY OF LIPASE: CPT

## 2020-08-14 PROCEDURE — U0002 COVID-19 LAB TEST NON-CDC: HCPCS

## 2020-08-14 RX ORDER — ONDANSETRON 2 MG/ML
4 INJECTION INTRAMUSCULAR; INTRAVENOUS
Status: COMPLETED | OUTPATIENT
Start: 2020-08-14 | End: 2020-08-14

## 2020-08-14 RX ORDER — CYCLOBENZAPRINE HCL 10 MG
10 TABLET ORAL
Status: COMPLETED | OUTPATIENT
Start: 2020-08-14 | End: 2020-08-14

## 2020-08-14 RX ORDER — CLINDAMYCIN HYDROCHLORIDE 300 MG/1
300 CAPSULE ORAL EVERY 6 HOURS
COMMUNITY
End: 2023-08-25

## 2020-08-14 RX ORDER — BACLOFEN 10 MG/1
10 TABLET ORAL 3 TIMES DAILY
Qty: 11 TABLET | Refills: 0 | OUTPATIENT
Start: 2020-08-14 | End: 2023-08-25

## 2020-08-14 RX ADMIN — ONDANSETRON 4 MG: 2 INJECTION INTRAMUSCULAR; INTRAVENOUS at 05:08

## 2020-08-14 RX ADMIN — SODIUM CHLORIDE 1000 ML: 0.9 INJECTION, SOLUTION INTRAVENOUS at 05:08

## 2020-08-14 RX ADMIN — SODIUM CHLORIDE 1000 ML: 0.9 INJECTION, SOLUTION INTRAVENOUS at 03:08

## 2020-08-14 RX ADMIN — CYCLOBENZAPRINE 10 MG: 10 TABLET, FILM COATED ORAL at 03:08

## 2020-08-14 NOTE — ED NOTES
"Dr. Dial informed pt on being admitted to the hospital. Pt states he does not want to stay, stating "I rather just go home". Physician explained the risks of leaving against medical advice and to return if he starts feeling worse. Pt verbalized understanding. Pt agrees to stay long enough to receive another liter of normal saline.   "

## 2020-08-14 NOTE — DISCHARGE INSTRUCTIONS
Thank you for coming to our Emergency Department today. It is important to remember that some problems are difficult to diagnose and may not be found during your first visit. Be sure to follow up with your primary care doctor and review any labs/imaging that was performed with them. If you do not have a primary care doctor, you may contact the one listed on your discharge paperwork or you may also call the [facility] Appointment Desk at [phone] to schedule an appointment with one.     All medications may potentially have side effects and it is impossible to predict which medications may give you side effects. If you feel that you are having a negative effect of any medication you should immediately stop taking them and seek medical attention.    Return to the ER with any questions/concerns, new/concerning symptoms, worsening or failure to improve. Do not drive or make any important decisions for 24 hours if you have received any pain medications, sedatives or mood altering drugs during your ER visit.

## 2020-08-14 NOTE — ED PROVIDER NOTES
"Encounter Date: 8/14/2020    SCRIBE #1 NOTE: I, Edgar Hall, am scribing for, and in the presence of,  Alfredito Dial MD. I have scribed the following portions of the note - the EKG reading. Other sections scribed: HPI, ROS, PE.       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 He states he has vomited x4 pta.      This 30 y.o M with a hx of Bipolar 1 disorder, Depression and Schizophrenia presents to the ED via EMS c/o LOC and emesis x4 PTA. 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 The pt notes that he began working at 0500h and was in the heat all day. The pt states he cannot recall the fall, but does remember being pulled up off of the ground. The pt woke up at 0300h this AM with RLE "tightness/stiffness." He reports his RLE became numb at 0900h while at work. He last felt normal yesterday prior to going to bed. The notes that he did not eat anything yesterday. He is compliant Clindamycin for a neck wound currently. He notes that he was previously prescribed Bactrim and Doxycycline for this neck wound. He does smoke cigarettes. He denies fever, chills, abdominal pain, diarrhea, dysuria, difficulty urinating, chest pain, SOB, seizures, rash and any other associated symptoms. EMS began administering fluids while en route to the ED.    The history is provided by the patient and the EMS personnel.     Review of patient's allergies indicates:   Allergen Reactions    Hydrocodone Nausea And Vomiting    Pork/porcine containing products Nausea And Vomiting     Past Medical History:   Diagnosis Date    Bipolar 1 disorder     Constipation     Depression     Gonorrhea     Pancreatitis     Schizophrenia      History reviewed. No pertinent surgical history.  History reviewed. No pertinent family history.  Social History " "    Tobacco Use    Smoking status: Current Every Day Smoker     Packs/day: 1.00     Types: Cigarettes    Smokeless tobacco: Never Used   Substance Use Topics    Alcohol use: Not Currently     Alcohol/week: 1.0 standard drinks     Types: 1 Shots of liquor per week     Comment: occasionally beer    Drug use: No     Review of Systems   Constitutional: Negative for chills and fever.   HENT: Negative for rhinorrhea and sore throat.    Eyes: Negative for redness.   Respiratory: Negative for cough and shortness of breath.    Cardiovascular: Negative for chest pain.   Gastrointestinal: Positive for vomiting. Negative for abdominal pain, diarrhea and nausea.   Genitourinary: Negative for dysuria.   Musculoskeletal: Negative for back pain.        (+) RLE "stiffness"   Skin: Negative for color change.   Neurological: Positive for syncope and numbness. Negative for weakness.   Psychiatric/Behavioral: The patient is not nervous/anxious.        Physical Exam     Initial Vitals [08/14/20 1447]   BP Pulse Resp Temp SpO2   (!) 131/90 72 18 97.8 °F (36.6 °C) 99 %      MAP       --         Physical Exam    Nursing note and vitals reviewed.  Constitutional: He appears well-developed and well-nourished.   HENT:   Head: Normocephalic and atraumatic. Head is without raccoon's eyes and without Owens's sign.   Right Ear: No hemotympanum.   Left Ear: No hemotympanum.   Nose: No nasal septal hematoma.   Mouth/Throat: Mucous membranes are normal.   Patent   Eyes: Conjunctivae and EOM are normal. Pupils are equal, round, and reactive to light. Right conjunctiva is not injected. Left conjunctiva is not injected.   sclera anicteric   Neck: Full passive range of motion without pain. Neck supple.   Cardiovascular: Regular rhythm, S1 normal, S2 normal and normal heart sounds. Exam reveals no gallop and no friction rub.    No murmur heard.  Pulses:       Radial pulses are 2+ on the right side and 2+ on the left side.   Pulmonary/Chest: Effort " normal and breath sounds normal. No respiratory distress.   Abdominal: Soft. Normal appearance. He exhibits no distension. There is no abdominal tenderness.   Musculoskeletal:      Comments: No lower extremity edema or cyanosis. No major tenderness.   Neurological: He is alert. No cranial nerve deficit or sensory deficit. Gait normal.   A&Ox4, normal speech.  Sensation to right thigh and arm that he states is decreased. Vision is intact. No tongue deviation.    Skin: Skin is warm. No ecchymosis and no rash noted.   Psychiatric: Thought content normal.   Very anxious. Difficult to get the patient to track history. Occasionally histrionic. Able to be intermittently calmed down.         ED Course   Procedures  Labs Reviewed   CBC W/ AUTO DIFFERENTIAL - Abnormal; Notable for the following components:       Result Value    WBC 19.77 (*)     Mean Corpuscular Hemoglobin 32.2 (*)     Immature Granulocytes 0.6 (*)     Gran # (ANC) 16.4 (*)     Immature Grans (Abs) 0.11 (*)     Mono # 1.6 (*)     Gran% 82.8 (*)     Lymph% 8.6 (*)     All other components within normal limits   COMPREHENSIVE METABOLIC PANEL - Abnormal; Notable for the following components:    CO2 22 (*)     Creatinine 2.4 (*)     Calcium 10.8 (*)     Total Protein 8.5 (*)     eGFR if  40 (*)     eGFR if non  34 (*)     All other components within normal limits   URINALYSIS, REFLEX TO URINE CULTURE - Abnormal; Notable for the following components:    Appearance, UA Cloudy (*)     Protein, UA 3+ (*)     Ketones, UA Trace (*)     Bilirubin (UA) 1+ (*)     Occult Blood UA 1+ (*)     Urobilinogen, UA 4.0-6.0 (*)     Leukocytes, UA Trace (*)     All other components within normal limits    Narrative:     Specimen Source->Urine   CK - Abnormal; Notable for the following components:     (*)     All other components within normal limits   DRUG SCREEN PANEL, URINE EMERGENCY - Abnormal; Notable for the following components:     Creatinine, Random Ur 390.1 (*)     All other components within normal limits    Narrative:     Specimen Source->Urine   SALICYLATE LEVEL - Abnormal; Notable for the following components:    Salicylate Lvl <5.0 (*)     All other components within normal limits   ACETAMINOPHEN LEVEL - Abnormal; Notable for the following components:    Acetaminophen (Tylenol), Serum <3.0 (*)     All other components within normal limits   PHOSPHORUS - Abnormal; Notable for the following components:    Phosphorus 1.7 (*)     All other components within normal limits   AMMONIA - Abnormal; Notable for the following components:    Ammonia 67 (*)     All other components within normal limits   URINALYSIS MICROSCOPIC - Abnormal; Notable for the following components:    RBC, UA 7 (*)     All other components within normal limits    Narrative:     Specimen Source->Urine   MAGNESIUM   LIPASE   ALCOHOL,MEDICAL (ETHANOL)   TSH   SARS-COV-2 RNA AMPLIFICATION, QUAL     EKG Readings: (Independently Interpreted)   Initial Reading: No STEMI. Rhythm: Normal Sinus Rhythm. Heart Rate: 91 bpm. Ectopy: No Ectopy. Conduction: Normal. ST Segments: Normal ST Segments. T Waves: Normal. Clinical Impression: Normal Sinus Rhythm   EKG done at 15:11       Imaging Results          CT Head Without Contrast (Final result)  Result time 08/14/20 16:26:56    Final result by Louis Gonzales MD (08/14/20 16:26:56)                 Impression:      1. No acute intracranial abnormality.  2. No CT evidence of cervical spine acute osseous traumatic injury.      Electronically signed by: Louis Gonzales MD  Date:    08/14/2020  Time:    16:26             Narrative:    EXAMINATION:  CT HEAD WITHOUT CONTRAST; CT CERVICAL SPINE WITHOUT CONTRAST    CLINICAL HISTORY:  Altered mental status;; Neck trauma, intoxicated or obtunded (Age < 65y);    TECHNIQUE:  Low dose axial CT images obtained throughout the head and cervical spine without intravenous contrast. Sagittal and coronal  reconstructions were performed.    COMPARISON:  Head CT 11/11/2016    FINDINGS:  Head CT:    Intracranial compartment:    Ventricles and sulci are normal in size for age without evidence of hydrocephalus. No extra-axial blood or fluid collections.    The brain parenchyma appears normal. No parenchymal mass, hemorrhage, edema or major vascular distribution infarct.    Skull/extracranial contents (limited evaluation): No fracture. Mastoid air cells and paranasal sinuses are essentially clear.  Imaged portions of the orbits are within normal limits.    Cervical spine CT: Bones are well mineralized.  Slight levocurvature with straightening of the cervical lordosis, likely related to positioning or muscle strain.  Vertebral body and intervertebral disc space heights appear relatively maintained.  No prevertebral soft tissue swelling or paraspinal mass or fluid collection.  No displaced fracture, dislocation or significant listhesis.  Minimal degenerative change at the atlantodental interval.  Dens and lateral masses are otherwise well aligned and intact.  No significant spinal canal stenosis or neural foraminal narrowing.  No subcutaneous emphysema or radiodense retained foreign body.  No apical pneumothorax.                               CT Cervical Spine Without Contrast (Final result)  Result time 08/14/20 16:26:56    Final result by Louis Gonzales MD (08/14/20 16:26:56)                 Impression:      1. No acute intracranial abnormality.  2. No CT evidence of cervical spine acute osseous traumatic injury.      Electronically signed by: Louis Gonzales MD  Date:    08/14/2020  Time:    16:26             Narrative:    EXAMINATION:  CT HEAD WITHOUT CONTRAST; CT CERVICAL SPINE WITHOUT CONTRAST    CLINICAL HISTORY:  Altered mental status;; Neck trauma, intoxicated or obtunded (Age < 65y);    TECHNIQUE:  Low dose axial CT images obtained throughout the head and cervical spine without intravenous contrast. Sagittal and  coronal reconstructions were performed.    COMPARISON:  Head CT 11/11/2016    FINDINGS:  Head CT:    Intracranial compartment:    Ventricles and sulci are normal in size for age without evidence of hydrocephalus. No extra-axial blood or fluid collections.    The brain parenchyma appears normal. No parenchymal mass, hemorrhage, edema or major vascular distribution infarct.    Skull/extracranial contents (limited evaluation): No fracture. Mastoid air cells and paranasal sinuses are essentially clear.  Imaged portions of the orbits are within normal limits.    Cervical spine CT: Bones are well mineralized.  Slight levocurvature with straightening of the cervical lordosis, likely related to positioning or muscle strain.  Vertebral body and intervertebral disc space heights appear relatively maintained.  No prevertebral soft tissue swelling or paraspinal mass or fluid collection.  No displaced fracture, dislocation or significant listhesis.  Minimal degenerative change at the atlantodental interval.  Dens and lateral masses are otherwise well aligned and intact.  No significant spinal canal stenosis or neural foraminal narrowing.  No subcutaneous emphysema or radiodense retained foreign body.  No apical pneumothorax.                               X-Ray Chest AP Portable (Final result)  Result time 08/14/20 15:38:25    Final result by Johnnie Medina MD (08/14/20 15:38:25)                 Impression:      No acute cardiopulmonary process.  No interval worsening.      Electronically signed by: Johnnie Medina MD  Date:    08/14/2020  Time:    15:38             Narrative:    EXAMINATION:  XR CHEST AP PORTABLE    CLINICAL HISTORY:  fall;    TECHNIQUE:  Single frontal view of the chest was performed.    COMPARISON:  05/14/2019    FINDINGS:  Trachea is patent.  Cardiac silhouette appears normal in size.  Lungs are well expanded and appear unremarkable.  No large effusion, pneumothorax or free air below the diaphragm.  There is  no acute osseous abnormality.                               X-Ray Pelvis Routine AP (Final result)  Result time 08/14/20 15:42:09    Final result by Juventino Medina MD (08/14/20 15:42:09)                 Impression:      No acute displaced fractures.      Electronically signed by: Juventino Medina MD  Date:    08/14/2020  Time:    15:42             Narrative:    EXAMINATION:  XR PELVIS ROUTINE AP; XR FEMUR 2 VIEW RIGHT    CLINICAL HISTORY:  Unspecified fall, initial encounter    TECHNIQUE:  AP view of the pelvis and multiple views of the right femur.    COMPARISON:  None.    FINDINGS:  No acute displaced fractures.  No suspicious lytic or blastic lesions.  Femoroacetabular cartilage spaces are preserved.  SI joints are symmetric.  Pubic symphysis is unremarkable.  Visualized soft tissues are within normal limits.                               X-Ray Femur 2 AP/LAT Right (Final result)  Result time 08/14/20 15:42:09    Final result by Juventino Medina MD (08/14/20 15:42:09)                 Impression:      No acute displaced fractures.      Electronically signed by: Juventino Medina MD  Date:    08/14/2020  Time:    15:42             Narrative:    EXAMINATION:  XR PELVIS ROUTINE AP; XR FEMUR 2 VIEW RIGHT    CLINICAL HISTORY:  Unspecified fall, initial encounter    TECHNIQUE:  AP view of the pelvis and multiple views of the right femur.    COMPARISON:  None.    FINDINGS:  No acute displaced fractures.  No suspicious lytic or blastic lesions.  Femoroacetabular cartilage spaces are preserved.  SI joints are symmetric.  Pubic symphysis is unremarkable.  Visualized soft tissues are within normal limits.                                 Medical Decision Making:   Initial Assessment:   30-year-old male presenting secondary to wide variety of complaints.  Originally was only muscle complaints with any stated some potential neurological deficits.  Patient is outside the window for any tPA administration.  Also concerned  about rhabdo in the patient along with acute kidney injury.  Clinically looks dehydrated.  Patient also is very anxious seems to be having panic attack.  Baclofen for patient.  Maintain his airway.  No neck stiffness.  Less likely meningitis.  Afebrile.  Continuing IV fluid administration.  Patient received 500 mL with EMS.  L fluids ordered here.    Patient has elevated white count.  Likely reactive.  Also has acute kidney injury with a marked drop in his GFR.  COVID negative.  Protein in his urine.  No infection.  Imaging was reassuring.  Will discuss admission with patient.  Ammonia is elevated.  Possibly reactive.    Please put in 35 minutes of critical care due to patient having a high risk of neurological failure.   Separate from teaching and exclusive of procedure and ekg time  Includes:  Time at bedside  Time reviewing test results  Time discussing case with staff  Time documenting the medical record  Time spent with family members  Time spent with consults    3105: The pt is now awake, alert and answering questions appropriately.  I went through his results with him.  He does not want to stay.  Nursing also was there.  I went into detail that he could have neurological compromise or kidney failure or worsening altered mental status.  Patient is alert and oriented now with decision-making capacity.  Patient states he lives at [place] with his family has to go back to pay for the hotel.  States that he will possibly return in the future.  He was given 2 L of 2 L. Patient was willing to sign the AMA form. The patient has decision making capacity . Patient has chosen to refuse medical evaluation/treatment and is able to communicate this verbally. Patient understands the relative risks, benefits, alternatives and consequences. Patient is able to reason, gives an adequate explanation of why he refuses evaluation/treatment.  This decision seems consistent with his value system and goals.  I gave patient follow-up  with Neurology and GI and primary care.      Independently Interpreted Test(s):   I have ordered and independently interpreted EKG Reading(s) - see prior notes  Clinical Tests:   Lab Tests: Ordered and Reviewed  Radiological Study: Ordered and Reviewed  Medical Tests: Ordered and Reviewed            Scribe Attestation:   Scribe #1: I performed the above scribed service and the documentation accurately describes the services I performed. I attest to the accuracy of the note.                          Clinical Impression:       ICD-10-CM ICD-9-CM   1. MISTY (acute kidney injury)  N17.9 584.9   2. Dehydration  E86.0 276.51   3. Fall  W19.XXXA E888.9   4. Anxiety  F41.9 300.00   5. Elevated CPK  R74.8 790.5   6. Injury of head, initial encounter  S09.90XA 959.01   7. Right sided weakness  R53.1 728.87             ED Disposition Condition    AMA                I, Alfredito Dial, personally performed the services described in this documentation. All medical record entries made by the scribe were at my direction and in my presence. I have reviewed the chart and agree that the record reflects my personal performance and is accurate and complete.             Alfredito Dial MD  08/14/20 [id number]

## 2020-08-14 NOTE — Clinical Note
Ricki Alaniz was seen and treated in our emergency department on 8/14/2020.  He may return to work on 08/20/2020.       If you have any questions or concerns, please don't hesitate to call.      Raquel Werner RN

## 2020-08-14 NOTE — ED TRIAGE NOTES
Pt presented to the ED via EMS. Pt lost consciousness  while ate work. Pt reported n/v, dizziness, and SOB. Pt states that this is the second time that he has loss consciousness, the first occurrence happening 3 years ago.

## 2020-08-19 ENCOUNTER — HOSPITAL ENCOUNTER (EMERGENCY)
Facility: HOSPITAL | Age: 30
Discharge: HOME | End: 2020-08-20
Attending: EMERGENCY MEDICINE
Payer: OTHER MISCELLANEOUS

## 2020-08-19 DIAGNOSIS — R93.41 ABNORMAL CT SCAN, BLADDER: Primary | ICD-10-CM

## 2020-08-19 DIAGNOSIS — N50.819 TESTICLE PAIN: ICD-10-CM

## 2020-08-19 PROCEDURE — 85025 COMPLETE CBC W/AUTO DIFF WBC: CPT

## 2020-08-19 PROCEDURE — 99284 EMERGENCY DEPT VISIT MOD MDM: CPT

## 2020-08-19 PROCEDURE — 81003 URINALYSIS AUTO W/O SCOPE: CPT

## 2020-08-19 PROCEDURE — 80053 COMPREHEN METABOLIC PANEL: CPT

## 2020-08-19 RX ORDER — DOXYCYCLINE HYCLATE 100 MG
TABLET ORAL
COMMUNITY
Start: 2020-07-08 | End: 2023-08-25

## 2020-08-19 NOTE — Clinical Note
Ricki Alaniz was seen and treated in our emergency department on 8/19/2020.  He may return to work on 08/21/2020.       If you have any questions or concerns, please don't hesitate to call.       RN

## 2020-08-19 NOTE — Clinical Note
Ricki Alaniz was seen and treated in our emergency department on 8/19/2020.  He may return to work on 08/21/2020.       If you have any questions or concerns, please don't hesitate to call.      Shanna Mclaughlin RN

## 2020-08-20 VITALS
DIASTOLIC BLOOD PRESSURE: 70 MMHG | HEIGHT: 71 IN | SYSTOLIC BLOOD PRESSURE: 111 MMHG | TEMPERATURE: 99 F | HEART RATE: 52 BPM | OXYGEN SATURATION: 100 % | WEIGHT: 220 LBS | RESPIRATION RATE: 18 BRPM | BODY MASS INDEX: 30.8 KG/M2

## 2020-08-20 LAB
ALBUMIN SERPL BCP-MCNC: 4.2 G/DL (ref 3.5–5.2)
ALP SERPL-CCNC: 74 U/L (ref 55–135)
ALT SERPL W/O P-5'-P-CCNC: 17 U/L (ref 10–44)
ANION GAP SERPL CALC-SCNC: 9 MMOL/L (ref 8–16)
AST SERPL-CCNC: 23 U/L (ref 10–40)
BASOPHILS # BLD AUTO: 0.04 K/UL (ref 0–0.2)
BASOPHILS NFR BLD: 0.4 % (ref 0–1.9)
BILIRUB SERPL-MCNC: 1 MG/DL (ref 0.1–1)
BILIRUB UR QL STRIP: NEGATIVE
BUN SERPL-MCNC: 15 MG/DL (ref 6–20)
CALCIUM SERPL-MCNC: 9.7 MG/DL (ref 8.7–10.5)
CHLORIDE SERPL-SCNC: 103 MMOL/L (ref 95–110)
CLARITY UR: CLEAR
CO2 SERPL-SCNC: 28 MMOL/L (ref 23–29)
COLOR UR: YELLOW
CREAT SERPL-MCNC: 1 MG/DL (ref 0.5–1.4)
DIFFERENTIAL METHOD: ABNORMAL
EOSINOPHIL # BLD AUTO: 0.1 K/UL (ref 0–0.5)
EOSINOPHIL NFR BLD: 1.2 % (ref 0–8)
ERYTHROCYTE [DISTWIDTH] IN BLOOD BY AUTOMATED COUNT: 13.1 % (ref 11.5–14.5)
EST. GFR  (AFRICAN AMERICAN): >60 ML/MIN/1.73 M^2
EST. GFR  (NON AFRICAN AMERICAN): >60 ML/MIN/1.73 M^2
GLUCOSE SERPL-MCNC: 106 MG/DL (ref 70–110)
GLUCOSE UR QL STRIP: NEGATIVE
HCT VFR BLD AUTO: 41.1 % (ref 40–54)
HGB BLD-MCNC: 13.7 G/DL (ref 14–18)
HGB UR QL STRIP: NEGATIVE
IMM GRANULOCYTES # BLD AUTO: 0.02 K/UL (ref 0–0.04)
IMM GRANULOCYTES NFR BLD AUTO: 0.2 % (ref 0–0.5)
KETONES UR QL STRIP: NEGATIVE
LEUKOCYTE ESTERASE UR QL STRIP: NEGATIVE
LYMPHOCYTES # BLD AUTO: 4.1 K/UL (ref 1–4.8)
LYMPHOCYTES NFR BLD: 44 % (ref 18–48)
MCH RBC QN AUTO: 32.2 PG (ref 27–31)
MCHC RBC AUTO-ENTMCNC: 33.3 G/DL (ref 32–36)
MCV RBC AUTO: 97 FL (ref 82–98)
MONOCYTES # BLD AUTO: 0.8 K/UL (ref 0.3–1)
MONOCYTES NFR BLD: 8.9 % (ref 4–15)
NEUTROPHILS # BLD AUTO: 4.2 K/UL (ref 1.8–7.7)
NEUTROPHILS NFR BLD: 45.3 % (ref 38–73)
NITRITE UR QL STRIP: NEGATIVE
NRBC BLD-RTO: 0 /100 WBC
PH UR STRIP: 5 [PH] (ref 5–8)
PLATELET # BLD AUTO: 206 K/UL (ref 150–350)
PMV BLD AUTO: 9.6 FL (ref 9.2–12.9)
POTASSIUM SERPL-SCNC: 3.6 MMOL/L (ref 3.5–5.1)
PROT SERPL-MCNC: 7.5 G/DL (ref 6–8.4)
PROT UR QL STRIP: NEGATIVE
RBC # BLD AUTO: 4.26 M/UL (ref 4.6–6.2)
SODIUM SERPL-SCNC: 140 MMOL/L (ref 136–145)
SP GR UR STRIP: >1.03 (ref 1–1.03)
URN SPEC COLLECT METH UR: ABNORMAL
UROBILINOGEN UR STRIP-ACNC: ABNORMAL EU/DL
WBC # BLD AUTO: 9.31 K/UL (ref 3.9–12.7)

## 2020-08-20 PROCEDURE — 25000003 PHARM REV CODE 250: Performed by: PHYSICIAN ASSISTANT

## 2020-08-20 RX ADMIN — SODIUM CHLORIDE 1000 ML: 0.9 INJECTION, SOLUTION INTRAVENOUS at 01:08

## 2020-08-20 NOTE — ED PROVIDER NOTES
Encounter Date: 8/19/2020       History     Chief Complaint   Patient presents with    Fall     Pt c/o RLQ pain that radiates into flank area, swollen testicles, back pain, intermittent headaches, and loss of appetite after experiencing a fall from a garbage truck last Friday. Also, c/o rash to neck x3mo. Pt reports he suffered a concussion and had LOC, was suppose to be admitted to hospital, but refused d/t being homeless and a single father. Pt returning for continued medical tx. d/t no relief of symptoms.      Chief Complaint:  Flank pain  History of  Present Illness: History obtained from patient. This 30 y.o. male who has past medical history of bipolar, schizophrenia and depression presents to the ED complaining of right-sided flank pain began 2 days ago.  Patient states he was seen here in this ED approximately 1 week ago after a fall from garbage truck.  Patient states that he was encouraged to stay for admission but could not leave his 16-year-old son who has hydrocephalus at home alone he states he is single that.  Also complains bilateral testicle pain.  He reports that he cannot void completely due to pain.  Denies nausea, vomiting, diarrhea, penile discharge, dysuria, hematuria, urinary frequency, fever.        Review of patient's allergies indicates:   Allergen Reactions    Hydrocodone Nausea And Vomiting    Pork/porcine containing products Nausea And Vomiting     Past Medical History:   Diagnosis Date    Bipolar 1 disorder     Constipation     Depression     Gonorrhea     Pancreatitis     Schizophrenia      History reviewed. No pertinent surgical history.  History reviewed. No pertinent family history.  Social History     Tobacco Use    Smoking status: Current Every Day Smoker     Packs/day: 1.00     Types: Cigarettes    Smokeless tobacco: Never Used   Substance Use Topics    Alcohol use: Not Currently     Alcohol/week: 1.0 standard drinks     Types: 1 Shots of liquor per week     Comment:  occasionally beer    Drug use: No     Review of Systems   Constitutional: Negative for chills and fever.   HENT: Negative for congestion, rhinorrhea and sore throat.    Eyes: Negative for visual disturbance.   Respiratory: Negative for cough and shortness of breath.    Cardiovascular: Negative for chest pain.   Gastrointestinal: Negative for abdominal pain, diarrhea, nausea and vomiting.   Genitourinary: Positive for flank pain and testicular pain. Negative for dysuria, frequency, hematuria, penile swelling and scrotal swelling.   Musculoskeletal: Negative for back pain.   Skin: Negative for rash.   Neurological: Negative for dizziness, weakness and headaches.       Physical Exam     Initial Vitals [08/19/20 2311]   BP Pulse Resp Temp SpO2   (!) 112/56 66 18 98.7 °F (37.1 °C) 97 %      MAP       --         Physical Exam    Nursing note and vitals reviewed.  Constitutional: He appears well-developed and well-nourished. No distress.   HENT:   Head: Normocephalic and atraumatic.   Right Ear: Tympanic membrane normal.   Left Ear: Tympanic membrane normal.   Nose: Nose normal.   Mouth/Throat: Uvula is midline, oropharynx is clear and moist and mucous membranes are normal.   Eyes: EOM are normal. Pupils are equal, round, and reactive to light.   Neck: Trachea normal, normal range of motion, full passive range of motion without pain and phonation normal. Neck supple. No stridor present. No spinous process tenderness and no muscular tenderness present. Normal range of motion present. No neck rigidity.   Cardiovascular: Normal rate, regular rhythm and normal heart sounds. Exam reveals no gallop and no friction rub.    No murmur heard.  Pulmonary/Chest: Effort normal and breath sounds normal. No respiratory distress. He has no wheezes. He has no rhonchi. He has no rales.   Abdominal: Soft. Bowel sounds are normal. He exhibits no mass. There is no abdominal tenderness. There is CVA tenderness (Right). There is no rebound and  no guarding. Hernia confirmed negative in the right inguinal area and confirmed negative in the left inguinal area.   Genitourinary:    Testes and penis normal.   Cremasteric reflex is present. Right testis shows no mass, no swelling and no tenderness. Left testis shows no mass, no swelling and no tenderness. Uncircumcised. No penile erythema or penile tenderness. No discharge found.   Musculoskeletal: Normal range of motion.   Lymphadenopathy: No inguinal adenopathy noted on the right or left side.   Neurological: He is alert and oriented to person, place, and time. He has normal strength. No cranial nerve deficit or sensory deficit.   Skin: Skin is warm and dry. Capillary refill takes less than 2 seconds.   Psychiatric: He has a normal mood and affect.         ED Course   Procedures  Labs Reviewed   CBC W/ AUTO DIFFERENTIAL - Abnormal; Notable for the following components:       Result Value    RBC 4.26 (*)     Hemoglobin 13.7 (*)     Mean Corpuscular Hemoglobin 32.2 (*)     All other components within normal limits   URINALYSIS, REFLEX TO URINE CULTURE - Abnormal; Notable for the following components:    Specific Gravity, UA >1.030 (*)     Urobilinogen, UA 4.0-6.0 (*)     All other components within normal limits    Narrative:     Specimen Source->Urine   COMPREHENSIVE METABOLIC PANEL          Imaging Results          US Scrotum And Testicles (Final result)  Result time 08/20/20 01:45:26    Final result by Rosalia Dietz MD (08/20/20 01:45:26)                 Impression:      No intra testicular masses.    Right epididymal cysts.    Small bilateral hydroceles.      Electronically signed by: Rosalia Dietz  Date:    08/20/2020  Time:    01:45             Narrative:    EXAMINATION:  TESTICULAR ULTRASOUND WITH DOPPLER ANALYSIS    CLINICAL HISTORY:  Testicular pain, unspecified    TECHNIQUE:  Real-time testicular ultrasound was performed. Color and pulse Doppler imaging was  utilized.    COMPARISON:  09/23/2019    FINDINGS:  The right testicle is homogeneous in echotexture and normal in size measuring 4.1 x 2.4 x 3.5 cm. There are no intratesticular masses. There is normal color flow seen to the right testicle.    There are couple right epididymal head cysts.  The largest right epididymal head cyst measures 8 x 6 x 7 mm.  There is a small right hydrocele.  There is no varicocele.    The left testicle is homogeneous in echotexture and normal in size measuring 3.8 x 2.5 x 3.5 cm. There is normal color flow seen to the left testicle.    The previously seen left epididymal head cyst is no longer appreciated.  There is a small left hydrocele.  There is no varicocele.    Color Doppler imaging demonstrates  blood flow in both testes. Pulse imaging demonstrates arterial and venous  waveforms.                                CT Renal Stone Study ABD Pelvis WO (Final result)  Result time 08/20/20 01:20:57    Final result by Rosalia Dietz MD (08/20/20 01:20:57)                 Impression:      No nephrolithiasis or hydronephrosis.    Left superolateral urinary bladder wall thickening.  2 mm hypodensity in the mildly thickened bladder wall or within the bladder.  Consider follow-up cystoscopy when clinically appropriate.    This report was flagged in Epic as abnormal.      Electronically signed by: Rosalia Dietz  Date:    08/20/2020  Time:    01:20             Narrative:    EXAMINATION:  CT RENAL STONE STUDY ABDOMEN PELVIS WITHOUT    CLINICAL HISTORY:  Flank pain    TECHNIQUE:  5 mm unenhanced axial images from the lung bases through the greater trochanters were performed.  Coronal and sagittal reformatted images were provided.    COMPARISON:  08/07/2019    FINDINGS:  Within the limits of a noncontrast examination, the liver, spleen, pancreas, and adrenal glands are unremarkable.  The gallbladder contains no calcified gallstones.    There is no nephrolithiasis or hydronephrosis.    There is  no gross abdominal adenopathy or ascites.    The urinary bladder is nearly decompressed.  There is mild thickening at the left superolateral urinary bladder wall (series 602 sagittal image 89 and series 601 coronal image 39).  There is a tiny 2 mm hypodensity either with in the urinary bladder wall or within the bladder proper (series 601 coronal image 38).  There are no pelvic masses or adenopathy.  There is no free pelvic fluid.  The appendix is unremarkable.    At the lung bases, there is mild bibasilar atelectatic change.                                 Medical Decision Making:   Initial Assessment:   This is an evaluation of a 30-year-old male who presents emergency depart complaining of right flank pain and bilateral testicle pain.  There is right CVA tenderness.   examination within normal limits.    Patient's chart was reviewed.  Patient presents the ED on 08/14/2020 status post fall at work.  Imaging was unremarkable.  Labs revealed MISTY.  Patient left AMA due to family issues.  Patient returns today stating that he is ready to be admitted to the hospital if need be.    Labs ordered.  CT and ultrasound ordered.  Will reassess.  Differential Diagnosis:   Differential diagnosis includes not limited to:  Nephrolithiasis, obstructive uropathy, UTI, pyelonephritis, testicular torsion, epididymitis, STI  Clinical Tests:   Lab Tests: Ordered and Reviewed  Radiological Study: Ordered and Reviewed  ED Management:  Kidney function improved from previous evaluation.  Labs otherwise unremarkable.  Ultrasound scrotum shows right epididymal cyst and bilateral hydroceles without other acute testicular abnormalities.  There is flow to bilateral testicles CT renal stone study shows no evidence of nephrolithiasis or hydronephrosis noting left superior lateral urinary bladder wall thickening with 2 mm hypodensity in the mildly thickened bladder wall.  No other acute intra-abdominal pathology.    Patient treated the ED with  IV fluids and Toradol.  Patient reports significant improvement of pain with interventions in the ED. Will refer patient to Urology for further management of abnormal CT findings in the urinary bladder.                                 Clinical Impression:       ICD-10-CM ICD-9-CM   1. Abnormal CT scan, bladder  R93.41 793.5   2. Testicle pain  N50.819 608.9             ED Disposition Condition    Discharge Stable        ED Prescriptions     None        Follow-up Information     Follow up With Specialties Details Why Contact Info    Johnny Cheatham MD Urology   [address] [facility] [address]  [facility] [address]  [phone]      [facility] Go in 1 day If symptoms worsen [address] [facility] [address] [phone]                                     Juan J Flores PA-C  08/20/20 0228

## 2020-08-20 NOTE — FIRST PROVIDER EVALUATION
Emergency Department TeleTRIAGE Encounter Note      CHIEF COMPLAINT    Chief Complaint   Patient presents with    Fall     Pt c/o RLQ pain that radiates into flank area, swollen testicles, back pain, intermittent headaches, and loss of appetite after experiencing a fall from a garbage truck last Friday. Also, c/o rash to neck x3mo. Pt reports he suffered a concussion and had LOC, was suppose to be admitted to hospital, but refused d/t being homeless and a single father. Pt returning for continued medical tx. d/t no relief of symptoms.        VITAL SIGNS   Initial Vitals [08/19/20 2311]   BP Pulse Resp Temp SpO2   (!) 112/56 66 18 98.7 °F (37.1 °C) 97 %      MAP       --            ALLERGIES    Review of patient's allergies indicates:   Allergen Reactions    Hydrocodone Nausea And Vomiting    Pork/porcine containing products Nausea And Vomiting       PROVIDER TRIAGE NOTE  Patient is a 30 y.o. male presenting to the ED for multiple complaints. Reports intermittent bilateral headaches, hand pain, right sided flank pain, rectal swelling. Recently in the ED on 8/14/2020 for work injury, signed out AMA with MISTY. Will order labs.       ORDERS  Labs Reviewed - No data to display    ED Orders (720h ago, onward)    Start Ordered     Status Ordering Provider    Unscheduled 08/19/20 2317  CBC auto differential  STAT      Ordered MADHAVI CHOI    Unscheduled 08/19/20 2317  Comprehensive metabolic panel  STAT      Ordered MADHAVI CHOI    Unscheduled 08/19/20 2317  Saline lock IV  Once      Ordered MADHAVI CHOI    Unscheduled 08/19/20 2317  Urinalysis, Reflex to Urine Culture Urine, Clean Catch  STAT      Ordered MADHAVI CHOI            Virtual Visit Note: The provider triage portion of this emergency department evaluation and documentation was performed via [employer], a HIPAA-compliant telemedicine application, in concert with a tele-presenter in the room. A face to face patient evaluation with one of my colleagues  will occur once the patient is placed in an emergency department room.      DISCLAIMER: This note was prepared with [employer] voice recognition transcription software. Garbled syntax, mangled pronouns, and other bizarre constructions may be attributed to that software system.

## 2022-05-24 ENCOUNTER — OCCUPATIONAL HEALTH (OUTPATIENT)
Dept: URGENT CARE | Facility: CLINIC | Age: 32
End: 2022-05-24
Payer: COMMERCIAL

## 2022-05-24 DIAGNOSIS — Z13.9 ENCOUNTER FOR SCREENING: Primary | ICD-10-CM

## 2022-05-24 LAB
CTP QC/QA: YES
POC 10 PANEL DRUG SCREEN: NEGATIVE

## 2022-05-24 PROCEDURE — 80305 POCT RAPID DRUG SCREEN 10 PANEL: ICD-10-PCS | Mod: S$GLB,,, | Performed by: PHYSICIAN ASSISTANT

## 2022-05-24 PROCEDURE — 80305 DRUG TEST PRSMV DIR OPT OBS: CPT | Mod: S$GLB,,, | Performed by: PHYSICIAN ASSISTANT

## 2022-05-24 NOTE — PROGRESS NOTES
Subjective:       Patient ID: Ricki Alaniz is a 31 y.o. male.    Chief Complaint: Drug / Alcohol Assessment    Patient is here for rapid 10 drug screen pre-employment.    Drug / Alcohol Assessment      ROS     Objective:      Physical Exam    Assessment:       No diagnosis found.    Plan:                   No follow-ups on file.

## 2023-08-25 ENCOUNTER — HOSPITAL ENCOUNTER (EMERGENCY)
Facility: HOSPITAL | Age: 33
Discharge: HOME | End: 2023-08-25
Attending: EMERGENCY MEDICINE
Payer: MEDICAID

## 2023-08-25 VITALS
HEIGHT: 70 IN | TEMPERATURE: 98 F | RESPIRATION RATE: 18 BRPM | SYSTOLIC BLOOD PRESSURE: 93 MMHG | DIASTOLIC BLOOD PRESSURE: 51 MMHG | BODY MASS INDEX: 28.92 KG/M2 | OXYGEN SATURATION: 100 % | WEIGHT: 202 LBS | HEART RATE: 62 BPM

## 2023-08-25 DIAGNOSIS — R05.9 COUGH: ICD-10-CM

## 2023-08-25 DIAGNOSIS — J06.9 VIRAL URI WITH COUGH: Primary | ICD-10-CM

## 2023-08-25 DIAGNOSIS — R07.81 PLEURITIC CHEST PAIN: ICD-10-CM

## 2023-08-25 LAB
ALBUMIN SERPL-MCNC: 3.6 G/DL (ref 3.3–5.5)
ALP SERPL-CCNC: 96 U/L (ref 42–141)
BILIRUB SERPL-MCNC: 1 MG/DL (ref 0.2–1.6)
BILIRUBIN, POC UA: NEGATIVE
BLOOD, POC UA: NEGATIVE
BUN SERPL-MCNC: 11 MG/DL (ref 7–22)
CALCIUM SERPL-MCNC: 8.9 MG/DL (ref 8–10.3)
CHLORIDE SERPL-SCNC: 103 MMOL/L (ref 98–108)
CLARITY, POC UA: CLEAR
COLOR, POC UA: ABNORMAL
CREAT SERPL-MCNC: 0.8 MG/DL (ref 0.6–1.2)
GLUCOSE SERPL-MCNC: 90 MG/DL (ref 73–118)
GLUCOSE, POC UA: NEGATIVE
INFLUENZA A ANTIGEN, POC: NEGATIVE
INFLUENZA B ANTIGEN, POC: NEGATIVE
KETONES, POC UA: NEGATIVE
LEUKOCYTE EST, POC UA: NEGATIVE
NITRITE, POC UA: NEGATIVE
PH UR STRIP: 7 [PH]
POC ALT (SGPT): 48 U/L (ref 10–47)
POC AST (SGOT): 50 U/L (ref 11–38)
POC CARDIAC TROPONIN I: 0 NG/ML (ref 0–0.08)
POC D-DI: <100 NG/ML (ref 0–450)
POC PTINR: 0.9 (ref 0.9–1.2)
POC PTWBT: 11.4 SEC (ref 9.7–14.3)
POC TCO2: 31 MMOL/L (ref 18–33)
POTASSIUM BLD-SCNC: 4.7 MMOL/L (ref 3.6–5.1)
PROTEIN, POC UA: ABNORMAL
PROTEIN, POC: 7.2 G/DL (ref 6.4–8.1)
SAMPLE: NORMAL
SAMPLE: NORMAL
SODIUM BLD-SCNC: 142 MMOL/L (ref 128–145)
SPECIFIC GRAVITY, POC UA: 1.02
UROBILINOGEN, POC UA: 2 E.U./DL

## 2023-08-25 PROCEDURE — 93005 ELECTROCARDIOGRAM TRACING: CPT | Mod: ER

## 2023-08-25 PROCEDURE — 99284 EMERGENCY DEPT VISIT MOD MDM: CPT | Mod: 25,ER

## 2023-08-25 PROCEDURE — 25000003 PHARM REV CODE 250: Mod: ER | Performed by: NURSE PRACTITIONER

## 2023-08-25 PROCEDURE — 87804 INFLUENZA ASSAY W/OPTIC: CPT | Mod: 59,ER

## 2023-08-25 PROCEDURE — 80053 COMPREHEN METABOLIC PANEL: CPT | Mod: ER

## 2023-08-25 PROCEDURE — 93010 EKG 12-LEAD: ICD-10-PCS | Mod: ,,, | Performed by: INTERNAL MEDICINE

## 2023-08-25 PROCEDURE — 96360 HYDRATION IV INFUSION INIT: CPT | Mod: ER

## 2023-08-25 PROCEDURE — 84484 ASSAY OF TROPONIN QUANT: CPT | Mod: ER

## 2023-08-25 PROCEDURE — 93010 ELECTROCARDIOGRAM REPORT: CPT | Mod: ,,, | Performed by: INTERNAL MEDICINE

## 2023-08-25 RX ORDER — SULINDAC 150 MG/1
150 TABLET ORAL 2 TIMES DAILY
Qty: 10 TABLET | Refills: 0 | Status: SHIPPED | OUTPATIENT
Start: 2023-08-25 | End: 2023-08-30

## 2023-08-25 RX ORDER — CYCLOBENZAPRINE HCL 10 MG
10 TABLET ORAL 3 TIMES DAILY PRN
Qty: 15 TABLET | Refills: 0 | Status: SHIPPED | OUTPATIENT
Start: 2023-08-25 | End: 2023-08-30

## 2023-08-25 RX ADMIN — SODIUM CHLORIDE 1000 ML: 9 INJECTION, SOLUTION INTRAVENOUS at 06:08

## 2023-08-25 NOTE — Clinical Note
"Ricki Alaniz (Eric) was seen and treated in our emergency department on 8/25/2023.  He may return to work on 08/28/2023.       If you have any questions or concerns, please don't hesitate to call.       RN    "

## 2023-08-25 NOTE — ED PROVIDER NOTES
Encounter Date: 8/25/2023    SCRIBE #1 NOTE: ILOREE, marry scribing for, and in the presence of,  Ronald Junior DNP. I have scribed the following portions of the note - Other sections scribed: HPI, ROS, PE, MDM.       History     Chief Complaint   Patient presents with    Abdominal Pain    URI     Pt presents to the ED with cough, congestion, muscle cramps, and feeling short of breath onset yesterday. Pt denies taking OTC meds.     Ricki Alaniz is a 33 y.o. male, with no pertinent PMHx, who presents to the ED with congestion which started 3 days age but began worsening this morning. Associated symptoms include sore throat and SOB. Pt also complains of intermittent left-sided chest pain (7/10), abdominal cramps, bilateral leg pain, and dysuria. 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 Chest pain is worse with cough and arm movement. He attempted treatment with Mucinex and Dayquil to minimal relief. No other exacerbating or alleviating factors. Denies fever, penile discharge, or other associated symptoms. He denies the use of tobacco, alcohol, or drugs. He denies any daily medications.    The history is provided by the patient. No  was used.     Review of patient's allergies indicates:   Allergen Reactions    Hydrocodone Nausea And Vomiting    Pork/porcine containing products Nausea And Vomiting     Past Medical History:   Diagnosis Date    Bipolar 1 disorder     Constipation     Depression     Gonorrhea     Pancreatitis     Schizophrenia      No past surgical history on file.  No family history on file.  Social History     Tobacco Use    Smoking status: Every Day     Current packs/day: 1.00     Types: Cigarettes    Smokeless tobacco: Never   Substance Use Topics    Alcohol use: Not Currently     Alcohol/week: 1.0 standard drink of alcohol     Types: 1 Shots of liquor per week     Comment: occasionally beer    Drug use: No     Review of  Systems   Constitutional:  Negative for appetite change, chills, diaphoresis, fatigue and fever.   HENT:  Positive for congestion and sore throat. Negative for ear discharge, ear pain, postnasal drip, rhinorrhea, sinus pressure, sneezing and voice change.    Eyes:  Negative for discharge, itching and visual disturbance.   Respiratory:  Positive for shortness of breath. Negative for cough and wheezing.    Cardiovascular:  Positive for chest pain. Negative for palpitations and leg swelling.   Gastrointestinal:  Positive for abdominal pain. Negative for nausea and vomiting.   Endocrine: Negative for polydipsia, polyphagia and polyuria.   Genitourinary:  Positive for dysuria. Negative for difficulty urinating, frequency, hematuria, penile discharge, penile pain, penile swelling and urgency.   Musculoskeletal:  Positive for myalgias (leg pain). Negative for arthralgias.   Skin:  Negative for rash and wound.   Neurological:  Negative for dizziness, seizures, syncope and weakness.   Hematological:  Negative for adenopathy. Does not bruise/bleed easily.   Psychiatric/Behavioral:  Negative for agitation and self-injury. The patient is not nervous/anxious.        Physical Exam     Initial Vitals [08/25/23 1528]   BP Pulse Resp Temp SpO2   137/88 72 18 98.8 °F (37.1 °C) 98 %      MAP       --         Physical Exam    Nursing note and vitals reviewed.  Constitutional: He appears well-developed and well-nourished. He is not diaphoretic. No distress. He is not intubated.   HENT:   Head: Normocephalic and atraumatic.   Right Ear: External ear normal.   Left Ear: External ear normal.   Nose: Nose normal.   Eyes: Pupils are equal, round, and reactive to light. Right eye exhibits no discharge. Left eye exhibits no discharge. No scleral icterus.   Neck:   Normal range of motion.  Cardiovascular:  Regular rhythm, S1 normal, S2 normal and normal heart sounds.     Exam reveals no gallop.       No murmur heard.  Pulmonary/Chest: Effort  normal and breath sounds normal. No accessory muscle usage. No apnea, no tachypnea and no bradypnea. He is not intubated. No respiratory distress. He has no decreased breath sounds. He has no wheezes. He has no rhonchi. He has no rales.   Chest pain reproducible with deep palpation   Abdominal: Abdomen is soft. Bowel sounds are normal. He exhibits no distension. There is no abdominal tenderness.   No right CVA tenderness.  No left CVA tenderness.   Musculoskeletal:         General: Normal range of motion.      Cervical back: Normal range of motion.     Neurological: He is alert and oriented to person, place, and time.   Skin: Skin is dry. Capillary refill takes less than 2 seconds.         ED Course   Procedures  Labs Reviewed   POCT URINALYSIS W/O SCOPE - Abnormal; Notable for the following components:       Result Value    Protein, UA 2+ (*)     Urobilinogen, UA 2.0 (*)     All other components within normal limits   POCT CMP - Abnormal; Notable for the following components:    ALT (SGPT), POC 48 (*)     AST (SGOT), POC 50 (*)     All other components within normal limits   TROPONIN ISTAT   POCT CBC   POCT URINALYSIS W/O SCOPE   POCT CMP   POCT PROTIME-INR   POCT TROPONIN   POCT D DIMER   POCT RAPID INFLUENZA A/B   ISTAT PROCEDURE   POCT D DIMER          Imaging Results              X-Ray Chest PA And Lateral (Final result)  Result time 08/25/23 18:10:29      Final result by Katherine Sagastume MD (08/25/23 18:10:29)                   Impression:      No acute cardiopulmonary process identified.      Electronically signed by: Katherine Sagastume MD  Date:    08/25/2023  Time:    18:10               Narrative:    EXAMINATION:  XR CHEST PA AND LATERAL    CLINICAL HISTORY:  cough;    TECHNIQUE:  PA and lateral views of the chest were performed.    COMPARISON:  August 2020.    FINDINGS:  Cardiac silhouette is normal in size.  Lungs are symmetrically expanded.  No evidence of focal consolidative process, pneumothorax, or  "significant pleural effusion.  No acute osseous abnormality identified.                                       Medications   sodium chloride 0.9% bolus 1,000 mL 1,000 mL (0 mLs Intravenous Stopped 8/25/23 1931)     Medical Decision Making  33 y.o. male presents to the ER for various complaints including sore throat, shortness of breath, back pain, URI symptoms, bilateral leg pain, dysuria.  On exam, patient had no abnormal findings associated with HEENT, respiratory, cardiac, dermatologic systems. Chest X-Ray imaging ordered. CBC, CMP, Protime-INR, Troponin, and D Dimer ordered.    Differential diagnosis include but are not limited to: PE, Viral illness, COVID-19, Influenza, Streptococcal pharyngitis, Sinusitis, Seasonal allergies, URI.      Problems Addressed:  Viral URI with cough: acute illness or injury    Amount and/or Complexity of Data Reviewed  Labs: ordered. Decision-making details documented in ED Course.  Radiology: ordered. Decision-making details documented in ED Course.  ECG/medicine tests: ordered. Decision-making details documented in ED Course.  Discussion of management or test interpretation with external provider(s): Diagnostics did not demonstrate causative etiology for the patient's symptoms.  I feel he likely has a viral syndrome constituting the majority of his symptoms.  I also feel the bulk of his symptoms are non emergent in etiology.  He was hemodynamically stable and vital signs remained reassuring throughout his entire visit.  I feel he may be discharged to follow up with primary care for all symptoms discussed during this visit.    Vital signs at the time of disposition were:  /88 (BP Location: Right arm, Patient Position: Sitting)   Pulse 72   Temp 98.8 °F (37.1 °C) (Oral)   Resp 18   Ht 5' 10" (1.778 m)   Wt 91.6 kg (202 lb)   SpO2 98%   BMI 28.98 kg/m²       See AVS for additional recommendations. Medications listed herein were prescribed after reviewing the patient's " allergies, medication list, history, most recent laboratories as available.  Referrals below were provided after reviewing the patient's previous medical providers. He understands he  should return for any worsening or changes in condition.  Prior to discharge the patient was asked if he  had any additional concerns or complaints and he declined. The patient was given an opportunity to ask questions and all were answered to his satisfaction.    Risk  Prescription drug management.  Diagnosis or treatment significantly limited by social determinants of health.      Additional MDM:   PERC Rule:   Age is greater than or equal to 50 = 0.0  Heart Rate is greater than or equal to 100 = 0.0  SaO2 on room air < 95% = 0.0  Unilateral leg swelling = 0.0  Hemoptysis = 0.0  Recent surgery or trauma = 0.0  Prior PE or DVT =  0.0  Hormone use = 0.00  PERC Score = 0      Heart Score:    History:          Slightly suspicious.  ECG:             Normal  Age:               Less than 45 years  Risk factors: no risk factors known  Troponin:       Less than or equal to normal limit  Heart Score = 0             Scribe Attestation:   Scribe #1: I performed the above scribed service and the documentation accurately describes the services I performed. I attest to the accuracy of the note.        ED Course as of 08/25/23 2335   Fri Aug 25, 2023   1605 BP: 137/88 [VC]   1605 Temp: 98.8 °F (37.1 °C) [VC]   1605 Temp Source: Oral [VC]   1605 Pulse: 72 [VC]   1605 SpO2: 98 % [VC]   1605 Resp: 18 [VC]   1707 EKG interpreted by Dr. Segura.  No STEMI.  Sinus rhythm, ventricular rate of 66.  Normal axis.  Normal EKG.  QTC normal at 402. [RF]   1741 POC PTWBT: 11.4 [VC]   1741 POC PTINR: 0.9 [VC]   1741 Influenza B Ag: negative [VC]   1741 Inflenza A Ag: negative [VC]   1741 BP: 112/68 [VC]   1741 Temp: 97.8 °F (36.6 °C) [VC]   1741 Pulse: 61 [VC]   1741 Resp(!): 22 [VC]   1741 SpO2: 98 % [VC]   1754 POC D-DI: <100 [VC]   1754 POC Cardiac Troponin I:  0.00 [VC]   1754 Sample: unknown [VC]   1754 POCT CMP(!)  Mild elevation of the alt/ast, normal cmp otherwise. [VC]   1755 POCT CBC  Normal cbc. [VC]   1813 X-Ray Chest PA And Lateral     No acute cardiopulmonary process identified. [VC]   1848 Pt notified of need for ua.  Given specimen container. [VC]   1904 BP: 117/64 [VC]   1904 Pulse: 63 [VC]   1904 Resp: 20 [VC]   1904 SpO2: 100 % [VC]      ED Course User Index  [RF] Heavenly Segura DO  [VC] Ronald Junior DNP                  I, Ronald Junior DNP ACNP-BC FNP-C ENP-C , personally performed the services described in this documentation.  All medical record entries made by the scribe were at my direction and in my presence.  I have reviewed the chart and agree that the record reflects my personal performance and is accurate and complete.  Clinical Impression:   Final diagnoses:  [R05.9] Cough  [J06.9] Viral URI with cough (Primary)  [R07.81] Pleuritic chest pain        ED Disposition Condition    Discharge Stable          ED Prescriptions       Medication Sig Dispense Start Date End Date Auth. Provider    sulindac (CLINORIL) 150 MG tablet Take 1 tablet (150 mg total) by mouth 2 (two) times daily. for 5 days 10 tablet 8/25/2023 8/30/2023 Ronald Junior DNP    cyclobenzaprine (FLEXERIL) 10 MG tablet Take 1 tablet (10 mg total) by mouth 3 (three) times daily as needed for Muscle spasms. 15 tablet 8/25/2023 8/30/2023 Ronald Junior DNP          Follow-up Information       Follow up With Specialties Details Why Contact Info    Nam Wilson MD Internal Medicine Schedule an appointment as soon as possible for a visit   [address] [id number] [phone]               Ronald Junior DNP  08/25/23 8190

## 2023-08-26 NOTE — DISCHARGE INSTRUCTIONS
You have been prescribed clinoril (sulindac), an anti-inflammatory.  Take this medication whether you feel you need it or not.  Do not take ibuprofen, naproxen or other NSAID's medications while taking this medication. You have also been prescribed flexeril (cyclobenzaprine).  You have been given a medication that causes drowsiness.  Do not operate motor vehicles, drink alcohol, or operate heavy machinery while taking this medication. Return to the Emergency Department for any worsening, change in condition, or any emergent concerns.       Use Delsym, over the counter for cough, as directed on package.     Flonase as directed on package.     Cepacol Lozenges as directed on package.  Warm fluids and warm saltwater gargles.       Return to the Emergency Department for any worsening, change in condition, or any emergent concerns.

## 2023-08-26 NOTE — ED NOTES
Assumed care from MAITE Urrutia, pt was resting comfortably on stretcher in no acute distress. Side rails up x2, call bell within reach, bed in lowest position. Updated pt on POC, vu, no addition needs at this time.

## 2024-09-17 ENCOUNTER — HOSPITAL ENCOUNTER (EMERGENCY)
Facility: HOSPITAL | Age: 34
Discharge: HOME | End: 2024-09-17
Attending: EMERGENCY MEDICINE
Payer: MEDICAID

## 2024-09-17 VITALS
HEIGHT: 71 IN | TEMPERATURE: 99 F | OXYGEN SATURATION: 100 % | RESPIRATION RATE: 18 BRPM | HEART RATE: 78 BPM | WEIGHT: 223.13 LBS | BODY MASS INDEX: 31.24 KG/M2 | DIASTOLIC BLOOD PRESSURE: 85 MMHG | SYSTOLIC BLOOD PRESSURE: 135 MMHG

## 2024-09-17 DIAGNOSIS — L03.116 CELLULITIS OF LEFT LOWER EXTREMITY: Primary | ICD-10-CM

## 2024-09-17 DIAGNOSIS — M79.605 LEFT LEG PAIN: ICD-10-CM

## 2024-09-17 PROCEDURE — 99283 EMERGENCY DEPT VISIT LOW MDM: CPT | Mod: 25,ER

## 2024-09-17 RX ORDER — CEPHALEXIN 500 MG/1
1000 CAPSULE ORAL 2 TIMES DAILY
Qty: 28 CAPSULE | Refills: 0 | Status: SHIPPED | OUTPATIENT
Start: 2024-09-17 | End: 2024-09-24

## 2024-09-17 NOTE — DISCHARGE INSTRUCTIONS
You were seen in the emergency department today for cellulitis from palm tree incident.  Please take all medications as prescribed and as we discussed.  Follow-up with specialist if instructed to do so.  It is important to remember that some problems are difficult to diagnose and may not be found during your Emergency Department visit. Be sure to follow up with your primary care doctor and review all labs/imaging/tests that were performed during this visit with them. Some labs/tests may be outside of the normal range and require non-emergent follow-up and further investigation to help diagnose/exclude/prevent complications or other medical conditions. Return to the emergency department for any new or worsening symptoms. Thank you for allowing me to care for you today, it was my pleasure. I hope you get to feeling better soon!

## 2024-09-17 NOTE — Clinical Note
"Ricki Pina" Corbin was seen and treated in our emergency department on 9/17/2024.  He may return to work on 09/20/2024.       If you have any questions or concerns, please don't hesitate to call.      Fantasma Blankenship PA-C"

## 2024-09-17 NOTE — ED PROVIDER NOTES
Encounter Date: 9/17/2024       History     Chief Complaint   Patient presents with    Leg Injury     Left leg pain. Pt states he has part of a pine tree in it.      Patient is a 34-year-old male with a past medical history of schizophrenia, depression, bipolar 1 disorder who presents to the emergency department for evaluation of left lower leg pain x 1 day.  Reports he was helping clean up after the bad weather and was carrying a dead palm tree and a thorn got into his leg.  Reports he tried to pull it out, states he tried to get it all but not sure if he did. Reports soaking it in warm water to help. Reports redness and pain the the area. Reports had some nausea, none now. No vomiting. No fever. No numbness or tingling. No other complaints.    The history is provided by the patient.     Review of patient's allergies indicates:   Allergen Reactions    Hydrocodone Nausea And Vomiting    Pork/porcine containing products Nausea And Vomiting     Past Medical History:   Diagnosis Date    Bipolar 1 disorder     Constipation     Depression     Gonorrhea     Pancreatitis     Schizophrenia      No past surgical history on file.  No family history on file.  Social History     Tobacco Use    Smoking status: Every Day     Current packs/day: 1.00     Types: Cigarettes    Smokeless tobacco: Never   Substance Use Topics    Alcohol use: Not Currently     Alcohol/week: 1.0 standard drink of alcohol     Types: 1 Shots of liquor per week     Comment: occasionally beer    Drug use: No     Review of Systems   Constitutional:  Negative for chills and fever.   Respiratory:  Negative for shortness of breath.    Cardiovascular:  Negative for chest pain.   Gastrointestinal:  Positive for nausea. Negative for abdominal pain, blood in stool, diarrhea and vomiting.   Musculoskeletal:  Positive for arthralgias. Negative for neck pain and neck stiffness.   Skin:  Positive for color change.   Neurological:  Negative for dizziness,  light-headedness, numbness and headaches.       Physical Exam     Initial Vitals [09/17/24 1013]   BP Pulse Resp Temp SpO2   138/89 76 19 98.4 °F (36.9 °C) 99 %      MAP       --         Physical Exam    Nursing note and vitals reviewed.  Constitutional: He appears well-developed and well-nourished.   HENT:   Head: Normocephalic and atraumatic.   Right Ear: External ear normal.   Left Ear: External ear normal.   Neck: Carotid bruit is not present.   Normal range of motion.  Cardiovascular:  Normal rate, regular rhythm, normal heart sounds and intact distal pulses.     Exam reveals no gallop and no friction rub.       No murmur heard.  Pulmonary/Chest: Breath sounds normal. No respiratory distress. He has no wheezes. He has no rhonchi. He has no rales.   Abdominal: Abdomen is soft. Bowel sounds are normal. He exhibits no distension. There is no abdominal tenderness. There is no rebound and no guarding.   Musculoskeletal:         General: Normal range of motion.      Cervical back: Normal range of motion.      Comments: There is an area of swelling and erythema to the anterolateral portion of the left lower extremity. 2+ DP pulse. Neurovascularly intact.  Compartments are soft.     Neurological: He is alert and oriented to person, place, and time. GCS score is 15. GCS eye subscore is 4. GCS verbal subscore is 5. GCS motor subscore is 6.   Psychiatric: He has a normal mood and affect.         ED Course   Procedures  Labs Reviewed - No data to display       Imaging Results              X-Ray Tibia Fibula 2 View Left (Final result)  Result time 09/17/24 12:08:01      Final result by Dennis Sadler MD (09/17/24 12:08:01)                   Impression:      Please see discussion above.      Electronically signed by: Dennis Sadler  Date:    09/17/2024  Time:    12:08               Narrative:    EXAMINATION:  XR TIBIA FIBULA 2 VIEW LEFT    CLINICAL HISTORY:  Pain in left leg    TECHNIQUE:  AP and lateral views of the  left tibia and fibula were performed.    COMPARISON:  None.    FINDINGS:  Small focus of increased density measuring on the order of 3-4 mm is noted at the dorsal aspect of the talar navicular joint, could relate to developmental ossicle (os supra naviculare) versus age-indeterminate avulsion fracture.  Correlation point tenderness would be recommended.    Examination otherwise negative for potential acute change.  Visualized joint spaces appear maintained.                                       Medications - No data to display  Medical Decision Making  This is an emergent evaluation of a 34-year-old male with a past medical history of schizophrenia, depression, bipolar 1 disorder who presents to the emergency department for evaluation of left lower leg pain x 1 day.  Reports he was helping clean up after the bad weather and was carrying a dead palm tree and a thorn got into his leg.    Patient looks well clinically. There is an area of swelling and erythema to the anterolateral portion of the left lower extremity. 2+ DP pulse. Neurovascularly intact.  Compartments are soft.  Regular rate rhythm without murmurs.  No carotid bruits appreciated on exam. Lungs are clear to auscultation bilaterally.  Abdomen is soft, nontender, non distended, with normal bowel sounds.     Differential diagnosis includes but is not limited to foreign body, fracture, dislocation, strain, cellulitis, other skin infection.  Considered but doubt compartment syndrome.    Workup initiated with x-ray.  Vital signs, chart, labs, and/or imaging were all reviewed.  See ED course below and interpretations above. My overall impression is cellulitis. Will discharge home with Keflex with PCP follow-up. Patient is very well appearing, and in no acute distress. Vital signs are reassuring here in the emergency department, patient is afebrile, breathing comfortable, satting 99 % on room air. Patient/Caregiver is stable for discharge at this time.   Patient/Caregiver was informed of results and plan of care. Patient/Caregiver verbalized understanding of care plan. All questions and concerns were addressed. Discussed strict return precautions with the patient/caregiver. Instructed follow up with primary care provider within 1 week.      Fantasma Blankenship PA-C    DISCLAIMER: This note was prepared with [employer] voice recognition transcription software. Garbled syntax, mangled pronouns, and other bizarre constructions may be attributed to that software system.       Amount and/or Complexity of Data Reviewed  Radiology: ordered. Decision-making details documented in ED Course.    Risk  Prescription drug management.               ED Course as of 09/17/24 1222   Tue Sep 17, 2024   1015 BP: 138/89 [TM]   1015 Temp: 98.4 °F (36.9 °C) [TM]   1015 Pulse: 76 [TM]   1015 Resp: 19 [TM]   1015 SpO2: 99 % [TM]   1214 X-Ray Tibia Fibula 2 View Left  Small focus of increased density measuring on the order of 3-4 mm is noted at the dorsal aspect of the talar navicular joint, could relate to developmental ossicle (os supra naviculare) versus age-indeterminate avulsion fracture.  Correlation point tenderness would be recommended.     Examination otherwise negative for potential acute change.  Visualized joint spaces appear maintained.      [TM]   1214 No tenderness over the talar navicular joint. [TM]   1214 Patient informed of results. Will treat for cellulitis. Will discharge home with keflex with PCP follow up. [TM]      ED Course User Index  [TM] Fantasma Blankenship PA-C                           Clinical Impression:  Final diagnoses:  [M79.605] Left leg pain  [L03.116] Cellulitis of left lower extremity (Primary)          ED Disposition Condition    Discharge Stable          ED Prescriptions       Medication Sig Dispense Start Date End Date Auth. Provider    cephALEXin (KEFLEX) 500 MG capsule Take 2 capsules (1,000 mg total) by mouth 2 (two) times a day. for 7 days 28 capsule  9/17/2024 9/24/2024 Fantasma Blankenship PA-C          Follow-up Information       Follow up With Specialties Details Why Contact Info    [facility] ED Emergency Medicine Go to  As needed, If symptoms worsen, or new symptoms develop [address] [facility] [id number] [phone]    Primary care doctor  Schedule an appointment as soon as possible for a visit in 3 days               Fantasma Blankenship PA-C  09/17/24 1228

## 2025-07-21 ENCOUNTER — OCCUPATIONAL HEALTH (OUTPATIENT)
Dept: URGENT CARE | Facility: CLINIC | Age: 35
End: 2025-07-21

## 2025-07-21 DIAGNOSIS — Z02.1 PRE-EMPLOYMENT EXAMINATION: Primary | ICD-10-CM

## 2025-08-13 ENCOUNTER — HOSPITAL ENCOUNTER (EMERGENCY)
Facility: HOSPITAL | Age: 35
Discharge: HOME | End: 2025-08-13
Attending: EMERGENCY MEDICINE
Payer: MEDICAID

## 2025-08-13 VITALS
HEART RATE: 64 BPM | OXYGEN SATURATION: 100 % | TEMPERATURE: 98 F | RESPIRATION RATE: 20 BRPM | SYSTOLIC BLOOD PRESSURE: 142 MMHG | DIASTOLIC BLOOD PRESSURE: 87 MMHG

## 2025-08-13 DIAGNOSIS — N48.9 PENILE LESION: Primary | ICD-10-CM

## 2025-08-13 DIAGNOSIS — Z72.51 HIGH RISK SEXUAL BEHAVIOR, UNSPECIFIED TYPE: ICD-10-CM

## 2025-08-13 DIAGNOSIS — R30.0 DYSURIA: ICD-10-CM

## 2025-08-13 LAB
BILIRUBIN, POC UA: NEGATIVE
BLOOD, POC UA: NEGATIVE
CLARITY, UA: CLEAR
COLOR, UA: YELLOW
GLUCOSE, POC UA: NEGATIVE
KETONES, POC UA: NEGATIVE
LEUKOCYTE EST, POC UA: NEGATIVE
NITRITE, POC UA: NEGATIVE
PH UR STRIP: 5.5 [PH] (ref 5–8)
PROTEIN, POC UA: NEGATIVE
SPECIFIC GRAVITY, POC UA: 1.02 (ref 1–1.03)
UROBILINOGEN, POC UA: 0.2 E.U./DL

## 2025-08-13 PROCEDURE — 96372 THER/PROPH/DIAG INJ SC/IM: CPT

## 2025-08-13 PROCEDURE — 87591 N.GONORRHOEAE DNA AMP PROB: CPT

## 2025-08-13 PROCEDURE — 63600175 PHARM REV CODE 636 W HCPCS: Mod: ER

## 2025-08-13 PROCEDURE — 81003 URINALYSIS AUTO W/O SCOPE: CPT | Mod: ER

## 2025-08-13 PROCEDURE — 86593 SYPHILIS TEST NON-TREP QUANT: CPT

## 2025-08-13 PROCEDURE — 99284 EMERGENCY DEPT VISIT MOD MDM: CPT | Mod: 25,ER

## 2025-08-13 RX ORDER — DOXYCYCLINE 100 MG/1
100 CAPSULE ORAL 2 TIMES DAILY
Qty: 14 CAPSULE | Refills: 0 | Status: SHIPPED | OUTPATIENT
Start: 2025-08-13 | End: 2025-08-20

## 2025-08-13 RX ORDER — CEFTRIAXONE 500 MG/1
500 INJECTION, POWDER, FOR SOLUTION INTRAMUSCULAR; INTRAVENOUS
Status: COMPLETED | OUTPATIENT
Start: 2025-08-13 | End: 2025-08-13

## 2025-08-13 RX ADMIN — CEFTRIAXONE SODIUM 500 MG: 500 INJECTION, POWDER, FOR SOLUTION INTRAMUSCULAR; INTRAVENOUS at 01:08

## 2025-08-14 LAB — T PALLIDUM IGG+IGM SER QL: NORMAL

## 2025-08-16 LAB
C TRACH DNA SPEC QL NAA+PROBE: NOT DETECTED
CTGC SOURCE (OHS) ORD-325: NORMAL
N GONORRHOEA DNA UR QL NAA+PROBE: NOT DETECTED